# Patient Record
Sex: FEMALE | Race: WHITE | NOT HISPANIC OR LATINO | Employment: PART TIME | URBAN - METROPOLITAN AREA
[De-identification: names, ages, dates, MRNs, and addresses within clinical notes are randomized per-mention and may not be internally consistent; named-entity substitution may affect disease eponyms.]

---

## 2017-02-08 ENCOUNTER — LAB REQUISITION (OUTPATIENT)
Dept: LAB | Facility: HOSPITAL | Age: 27
End: 2017-02-08
Payer: COMMERCIAL

## 2017-02-08 ENCOUNTER — ALLSCRIPTS OFFICE VISIT (OUTPATIENT)
Dept: OTHER | Facility: OTHER | Age: 27
End: 2017-02-08

## 2017-02-08 DIAGNOSIS — Z01.419 ENCOUNTER FOR GYNECOLOGICAL EXAMINATION WITHOUT ABNORMAL FINDING: ICD-10-CM

## 2017-02-08 PROCEDURE — G0145 SCR C/V CYTO,THINLAYER,RESCR: HCPCS | Performed by: PHYSICIAN ASSISTANT

## 2017-02-13 ENCOUNTER — GENERIC CONVERSION - ENCOUNTER (OUTPATIENT)
Dept: OTHER | Facility: OTHER | Age: 27
End: 2017-02-13

## 2017-02-13 LAB
LAB AP GYN PRIMARY INTERPRETATION: NORMAL
LAB AP LMP: NORMAL
Lab: NORMAL

## 2017-05-09 ENCOUNTER — APPOINTMENT (OUTPATIENT)
Dept: PHYSICAL THERAPY | Facility: REHABILITATION | Age: 27
End: 2017-05-09
Payer: COMMERCIAL

## 2017-05-09 PROCEDURE — 97161 PT EVAL LOW COMPLEX 20 MIN: CPT

## 2017-05-09 PROCEDURE — 97110 THERAPEUTIC EXERCISES: CPT

## 2017-05-16 ENCOUNTER — APPOINTMENT (OUTPATIENT)
Dept: PHYSICAL THERAPY | Facility: REHABILITATION | Age: 27
End: 2017-05-16
Payer: COMMERCIAL

## 2017-05-16 PROCEDURE — 97110 THERAPEUTIC EXERCISES: CPT

## 2017-05-16 PROCEDURE — 97140 MANUAL THERAPY 1/> REGIONS: CPT

## 2017-05-16 PROCEDURE — 97112 NEUROMUSCULAR REEDUCATION: CPT

## 2017-05-19 ENCOUNTER — APPOINTMENT (OUTPATIENT)
Dept: PHYSICAL THERAPY | Facility: REHABILITATION | Age: 27
End: 2017-05-19
Payer: COMMERCIAL

## 2017-05-19 PROCEDURE — 97140 MANUAL THERAPY 1/> REGIONS: CPT

## 2017-05-19 PROCEDURE — 97110 THERAPEUTIC EXERCISES: CPT

## 2017-05-23 ENCOUNTER — APPOINTMENT (OUTPATIENT)
Dept: PHYSICAL THERAPY | Facility: REHABILITATION | Age: 27
End: 2017-05-23
Payer: COMMERCIAL

## 2017-05-23 PROCEDURE — 97110 THERAPEUTIC EXERCISES: CPT

## 2017-05-23 PROCEDURE — 97140 MANUAL THERAPY 1/> REGIONS: CPT

## 2017-05-31 ENCOUNTER — APPOINTMENT (OUTPATIENT)
Dept: PHYSICAL THERAPY | Facility: REHABILITATION | Age: 27
End: 2017-05-31
Payer: COMMERCIAL

## 2017-05-31 PROCEDURE — 97110 THERAPEUTIC EXERCISES: CPT

## 2017-05-31 PROCEDURE — 97140 MANUAL THERAPY 1/> REGIONS: CPT

## 2017-06-07 ENCOUNTER — APPOINTMENT (OUTPATIENT)
Dept: PHYSICAL THERAPY | Facility: REHABILITATION | Age: 27
End: 2017-06-07
Payer: COMMERCIAL

## 2017-06-07 PROCEDURE — 97162 PT EVAL MOD COMPLEX 30 MIN: CPT

## 2017-06-07 PROCEDURE — 97110 THERAPEUTIC EXERCISES: CPT

## 2017-06-07 PROCEDURE — 97140 MANUAL THERAPY 1/> REGIONS: CPT

## 2017-06-15 ENCOUNTER — APPOINTMENT (OUTPATIENT)
Dept: PHYSICAL THERAPY | Facility: REHABILITATION | Age: 27
End: 2017-06-15
Payer: COMMERCIAL

## 2017-06-15 PROCEDURE — 97140 MANUAL THERAPY 1/> REGIONS: CPT

## 2017-06-15 PROCEDURE — 97110 THERAPEUTIC EXERCISES: CPT

## 2017-06-22 ENCOUNTER — APPOINTMENT (OUTPATIENT)
Dept: PHYSICAL THERAPY | Facility: REHABILITATION | Age: 27
End: 2017-06-22
Payer: COMMERCIAL

## 2017-06-22 PROCEDURE — 97140 MANUAL THERAPY 1/> REGIONS: CPT

## 2017-06-22 PROCEDURE — 97110 THERAPEUTIC EXERCISES: CPT

## 2017-06-30 ENCOUNTER — APPOINTMENT (OUTPATIENT)
Dept: PHYSICAL THERAPY | Facility: REHABILITATION | Age: 27
End: 2017-06-30
Payer: COMMERCIAL

## 2017-07-07 ENCOUNTER — APPOINTMENT (OUTPATIENT)
Dept: PHYSICAL THERAPY | Facility: REHABILITATION | Age: 27
End: 2017-07-07
Payer: COMMERCIAL

## 2017-07-07 PROCEDURE — 97140 MANUAL THERAPY 1/> REGIONS: CPT

## 2017-07-07 PROCEDURE — 97110 THERAPEUTIC EXERCISES: CPT

## 2017-07-18 ENCOUNTER — APPOINTMENT (OUTPATIENT)
Dept: PHYSICAL THERAPY | Facility: REHABILITATION | Age: 27
End: 2017-07-18
Payer: COMMERCIAL

## 2017-07-18 PROCEDURE — 97110 THERAPEUTIC EXERCISES: CPT

## 2017-07-18 PROCEDURE — 97140 MANUAL THERAPY 1/> REGIONS: CPT

## 2017-07-18 PROCEDURE — 97014 ELECTRIC STIMULATION THERAPY: CPT

## 2017-07-26 ENCOUNTER — APPOINTMENT (OUTPATIENT)
Dept: PHYSICAL THERAPY | Facility: REHABILITATION | Age: 27
End: 2017-07-26
Payer: COMMERCIAL

## 2017-07-26 PROCEDURE — 97110 THERAPEUTIC EXERCISES: CPT

## 2017-07-26 PROCEDURE — 97140 MANUAL THERAPY 1/> REGIONS: CPT

## 2017-08-02 ENCOUNTER — APPOINTMENT (OUTPATIENT)
Dept: PHYSICAL THERAPY | Facility: REHABILITATION | Age: 27
End: 2017-08-02
Payer: COMMERCIAL

## 2017-08-02 PROCEDURE — 97110 THERAPEUTIC EXERCISES: CPT

## 2017-09-05 ENCOUNTER — APPOINTMENT (EMERGENCY)
Dept: RADIOLOGY | Facility: HOSPITAL | Age: 27
End: 2017-09-05
Payer: OTHER MISCELLANEOUS

## 2017-09-05 ENCOUNTER — HOSPITAL ENCOUNTER (EMERGENCY)
Facility: HOSPITAL | Age: 27
Discharge: HOME | End: 2017-09-05
Attending: EMERGENCY MEDICINE | Admitting: EMERGENCY MEDICINE
Payer: OTHER MISCELLANEOUS

## 2017-09-05 VITALS
SYSTOLIC BLOOD PRESSURE: 150 MMHG | OXYGEN SATURATION: 97 % | TEMPERATURE: 98.3 F | HEART RATE: 120 BPM | WEIGHT: 120 LBS | DIASTOLIC BLOOD PRESSURE: 74 MMHG | RESPIRATION RATE: 18 BRPM

## 2017-09-05 DIAGNOSIS — S60.10XA: ICD-10-CM

## 2017-09-05 DIAGNOSIS — S62.639A CLOSED FRACTURE OF TUFT OF DISTAL PHALANX OF FINGER: Primary | ICD-10-CM

## 2017-09-05 PROCEDURE — 99283 EMERGENCY DEPT VISIT LOW MDM: CPT

## 2017-09-05 PROCEDURE — 90715 TDAP VACCINE 7 YRS/> IM: CPT | Performed by: EMERGENCY MEDICINE

## 2017-09-05 PROCEDURE — 73140 X-RAY EXAM OF FINGER(S): CPT

## 2017-09-05 PROCEDURE — 90471 IMMUNIZATION ADMIN: CPT

## 2017-09-05 RX ORDER — LIDOCAINE HYDROCHLORIDE 10 MG/ML
10 INJECTION, SOLUTION INFILTRATION; PERINEURAL ONCE
Status: DISCONTINUED | OUTPATIENT
Start: 2017-09-05 | End: 2017-09-05

## 2017-09-05 RX ORDER — OXYCODONE HYDROCHLORIDE AND ACETAMINOPHEN 5; 325 MG/1; MG/1
1 TABLET ORAL EVERY 4 HOURS PRN
Qty: 10 TABLET | Refills: 0 | Status: SHIPPED | OUTPATIENT
Start: 2017-09-05 | End: 2017-09-15

## 2017-09-05 RX ORDER — OXYCODONE HYDROCHLORIDE AND ACETAMINOPHEN 5; 325 MG/1; MG/1
1 TABLET ORAL ONCE
Status: COMPLETED | OUTPATIENT
Start: 2017-09-05 | End: 2017-09-05

## 2017-09-05 RX ORDER — DROSPIRENONE AND ETHINYL ESTRADIOL 0.02-3(28)
1 KIT ORAL DAILY
COMMUNITY
End: 2018-02-09

## 2017-09-05 RX ORDER — LIDOCAINE HYDROCHLORIDE 10 MG/ML
10 INJECTION, SOLUTION EPIDURAL; INFILTRATION; INTRACAUDAL; PERINEURAL ONCE
Status: COMPLETED | OUTPATIENT
Start: 2017-09-05 | End: 2017-09-05

## 2017-09-05 RX ADMIN — TETANUS TOXOID, REDUCED DIPHTHERIA TOXOID AND ACELLULAR PERTUSSIS VACCINE, ADSORBED 0.5 ML: 5; 2.5; 8; 8; 2.5 SUSPENSION INTRAMUSCULAR at 15:03

## 2017-09-05 RX ADMIN — LIDOCAINE HYDROCHLORIDE 10 ML: 10 INJECTION, SOLUTION EPIDURAL; INFILTRATION; INTRACAUDAL; PERINEURAL at 14:59

## 2017-09-05 RX ADMIN — OXYCODONE AND ACETAMINOPHEN 1 TABLET: 5; 325 TABLET ORAL at 13:53

## 2017-10-10 ENCOUNTER — APPOINTMENT (OUTPATIENT)
Dept: LAB | Age: 27
End: 2017-10-10
Payer: COMMERCIAL

## 2017-10-10 ENCOUNTER — TRANSCRIBE ORDERS (OUTPATIENT)
Dept: ADMINISTRATIVE | Age: 27
End: 2017-10-10

## 2017-10-10 DIAGNOSIS — Z01.818 PRE-OPERATIVE CLEARANCE: ICD-10-CM

## 2017-10-10 DIAGNOSIS — Z01.818 PRE-OPERATIVE CLEARANCE: Primary | ICD-10-CM

## 2017-10-10 LAB
ERYTHROCYTE [DISTWIDTH] IN BLOOD BY AUTOMATED COUNT: 12 % (ref 11.6–15.1)
HCT VFR BLD AUTO: 42.4 % (ref 34.8–46.1)
HGB BLD-MCNC: 14.6 G/DL (ref 11.5–15.4)
MCH RBC QN AUTO: 30.7 PG (ref 26.8–34.3)
MCHC RBC AUTO-ENTMCNC: 34.4 G/DL (ref 31.4–37.4)
MCV RBC AUTO: 89 FL (ref 82–98)
PLATELET # BLD AUTO: 346 THOUSANDS/UL (ref 149–390)
PMV BLD AUTO: 9.7 FL (ref 8.9–12.7)
RBC # BLD AUTO: 4.76 MILLION/UL (ref 3.81–5.12)
WBC # BLD AUTO: 6.26 THOUSAND/UL (ref 4.31–10.16)

## 2017-10-10 PROCEDURE — 36415 COLL VENOUS BLD VENIPUNCTURE: CPT

## 2017-10-10 PROCEDURE — 85027 COMPLETE CBC AUTOMATED: CPT

## 2018-01-11 NOTE — MISCELLANEOUS
Message   Recorded as Task   Date: 03/16/2017 12:39 PM, Created By: Brigette Doshi   Task Name: Call Back   Assigned To: Elsie Darling   Regarding Patient: Allan Rueda, Status: In Progress   Comment:    Alethea Lala - 16 Mar 2017 12:39 PM     TASK CREATED  PT CALLED C/O BREAKTHROUGH BLEEDING X 15 DAYS ON NEW BCP  SHE WILL BE IN A MEETING FROM 1:15PM TO 2:15PM  HER # [phone]   Iman Landeros - 16 Mar 2017 2:45 PM     TASK IN PROGRESS   Iman Landeros - 16 Mar 2017 2:46 PM     TASK EDITED  Jasmyne Mackenzie - 39 Mar 2017 4:16 PM     TASK EDITED  Pt returned your call and LM  Iman Landeros - [phone] [id number] 4:17 PM     TASK IN PROGRESS   Iman Landeros - 16 Mar 2017 4:22 PM     TASK EDITED  Pt on generics of eloy  She takes all actives for 3 mos  She had menses just before she saw you  She was on a generic called Ismael Zeng last mo and this mo (new pharm) put on [facility]  She has been bleeding entire 3 weeks of new pill  Nothing missed or late  What to do??   Emilee Herndon - 17 Mar 2017 7:04 AM     TASK REPLIED TO: Previously Assigned To Emilee Herndon  whatever generic she likes we can call in that generic with "brand necessary "   Liz Rosa - 17 Mar 2017 8:22 AM     TASK EDITED  spoke with pt    she would like to have rx sent to [facility], bath for [facility]  rx to [employer] [facility] - 17 Mar 2017 8:25 AM     TASK EDITED  rx sent as thi        Active Problems    1  Contraceptives (V25 02)   2  Encounter for gynecological examination without abnormal finding (V72 31) (Z01 419)   3  Sore throat (462) (J02 9)   4  Surveillance of previously prescribed contraceptive method (V25 40) (Z30 40)    Current Meds   1  Acidophilus CAPS; Therapy: (Recorded:17Jan2014) to Recorded   2  BD Allergy Syringe 28G X 1/2" 1 ML Miscellaneous; Therapy: 13SFM1112 to (Last Rx:28Nov2011)  Requested for: 28Nov2011 Ordered   3  [facility] Vitamin D CAPS; Therapy: (Recorded:17Jan2014) to Recorded   4  ELOY 3-0 02 MG Oral Tablet;  Take one active pill daily do not take inactive pills cont cycle   with the pill; Therapy: 32YVO4225 to (Last Rx:46Vyf2979)  Requested for: 58IED9547 Ordered    Allergies    1   Sulfa Drugs    Plan  Contraceptives, Surveillance of previously prescribed contraceptive method    · From  ELOY 3-0 02 MG Oral Tablet Take one active pill daily do not take inactive  pills cont cycle with the pill To Nara 3-0 02 MG Oral Tablet TAKE 1 TABLET DAILY    Signatures   Electronically signed by : Ronda Norwood, ; Mar 17 2017  8:25AM EST                       (Author)

## 2018-01-14 VITALS
WEIGHT: 121 LBS | HEIGHT: 62 IN | BODY MASS INDEX: 22.26 KG/M2 | SYSTOLIC BLOOD PRESSURE: 110 MMHG | DIASTOLIC BLOOD PRESSURE: 64 MMHG

## 2018-02-09 ENCOUNTER — OFFICE VISIT (OUTPATIENT)
Dept: OBGYN CLINIC | Facility: CLINIC | Age: 28
End: 2018-02-09
Payer: COMMERCIAL

## 2018-02-09 VITALS
BODY MASS INDEX: 22.82 KG/M2 | HEIGHT: 62 IN | WEIGHT: 124 LBS | DIASTOLIC BLOOD PRESSURE: 64 MMHG | SYSTOLIC BLOOD PRESSURE: 102 MMHG

## 2018-02-09 DIAGNOSIS — Z01.419 ENCNTR FOR GYN EXAM (GENERAL) (ROUTINE) W/O ABN FINDINGS: Primary | ICD-10-CM

## 2018-02-09 PROCEDURE — 99395 PREV VISIT EST AGE 18-39: CPT | Performed by: PHYSICIAN ASSISTANT

## 2018-02-09 RX ORDER — DROSPIRENONE AND ETHINYL ESTRADIOL 0.02-3(28)
1 KIT ORAL DAILY
Qty: 90 TABLET | Refills: 3 | Status: SHIPPED | OUTPATIENT
Start: 2018-02-09 | End: 2018-10-31 | Stop reason: SDUPTHER

## 2018-02-09 RX ORDER — DROSPIRENONE AND ETHINYL ESTRADIOL 0.02-3(28)
KIT ORAL
COMMUNITY
Start: 2012-02-08 | End: 2018-02-09 | Stop reason: SDUPTHER

## 2018-02-09 NOTE — PROGRESS NOTES
Kajal Candelario  1990    CC:  Yearly exam    S:  32 y o  female here for yearly exam  Her cycles are regular, not heavy or crampy  She is sexually active  She uses [place] (pete) for contraception  She does not request STD testing today  She is  and happy  She works in the [employer] and as a flight nurse for [employer]  She had a rough year this past year when her coworker in his [employer]P  in a helicopter crash  She decided to go forward with a breast augmentation that she had always wanted to do and she is very happy with the results  Last Pap 17 neg      Current Outpatient Prescriptions:     drospirenone-ethinyl estradiol (Beverely Ezel) 3-0 02 MG per tablet, Take 1 tablet by mouth daily, Disp: 90 tablet, Rfl: 3  Social History     Social History    Marital status: Single     Spouse name: N/A    Number of children: N/A    Years of education: N/A     Occupational History    Not on file       Social History Main Topics    Smoking status: Never Smoker    Smokeless tobacco: Never Used    Alcohol use Yes      Comment: social     Drug use: No    Sexual activity: Yes     Partners: Male     Birth control/ protection: Pill     Other Topics Concern    Not on file     Social History Narrative    No caffeine use    Exercises regularly         Family History   Problem Relation Age of Onset    Arthritis Mother    Cinthya Pall Breast cancer Mother     Depression Mother     Anxiety disorder Mother     Insomnia Mother     Osteoporosis Mother     Arthritis Father     Hypertension Father     Hyperlipidemia Father     Depression Maternal Grandmother     Anxiety disorder Maternal Grandmother     Osteoporosis Maternal Grandmother     Heart disease Maternal Grandfather     Hypertension Paternal Grandmother     Heart disease Paternal Grandfather     Insomnia Sister      Past Medical History:   Diagnosis Date    Anxiety     H/O breast augmentation          O:  Blood pressure 102/64, height 5' 1 81" (1 57 m), weight 56 2 kg (124 lb)  Patient appears well and is not in distress  Neck is supple without masses  Breasts are symmetrical without mass, tenderness, nipple discharge, skin changes or adenopathy  Abdomen is soft and nontender without masses  External genitals are normal without lesions or rashes  Vagina is normal without discharge or bleeding  Cervix is normal without discharge or lesion  Uterus is normal, mobile, nontender without palpable mass  Adnexa are normal, nontender, without palpable mass  A:  Yearly exam      P:  Rx Nara sent to mail order  RTO one year for yearly exam or sooner as needed

## 2018-02-22 ENCOUNTER — TELEPHONE (OUTPATIENT)
Dept: OBGYN CLINIC | Facility: CLINIC | Age: 28
End: 2018-02-22

## 2018-10-31 DIAGNOSIS — Z01.419 ENCNTR FOR GYN EXAM (GENERAL) (ROUTINE) W/O ABN FINDINGS: ICD-10-CM

## 2018-10-31 RX ORDER — ETHINYL ESTRADIOL/DROSPIRENONE 0.02-3(28)
1 TABLET ORAL DAILY
Qty: 90 TABLET | Refills: 1 | Status: SHIPPED | OUTPATIENT
Start: 2018-10-31 | End: 2018-11-19 | Stop reason: SDUPTHER

## 2018-11-19 ENCOUNTER — TELEPHONE (OUTPATIENT)
Dept: OBGYN CLINIC | Facility: CLINIC | Age: 28
End: 2018-11-19

## 2018-11-19 DIAGNOSIS — Z01.419 ENCNTR FOR GYN EXAM (GENERAL) (ROUTINE) W/O ABN FINDINGS: ICD-10-CM

## 2018-11-19 NOTE — PROGRESS NOTES
Call from patient  [address] [facility] does not carry [facility] any longer  Asked to send to CV, [address]  Resent as requested

## 2018-11-26 RX ORDER — ETHINYL ESTRADIOL/DROSPIRENONE 0.02-3(28)
1 TABLET ORAL DAILY
Qty: 90 TABLET | Refills: 1 | Status: SHIPPED | OUTPATIENT
Start: 2018-11-26 | End: 2019-02-19 | Stop reason: SDUPTHER

## 2018-12-07 ENCOUNTER — TRANSCRIBE ORDERS (OUTPATIENT)
Dept: ADMINISTRATIVE | Facility: HOSPITAL | Age: 28
End: 2018-12-07

## 2018-12-07 ENCOUNTER — APPOINTMENT (OUTPATIENT)
Dept: LAB | Facility: HOSPITAL | Age: 28
End: 2018-12-07
Attending: PLASTIC SURGERY
Payer: COMMERCIAL

## 2018-12-07 DIAGNOSIS — Z01.812 PRE-OPERATIVE LABORATORY EXAMINATION: Primary | ICD-10-CM

## 2018-12-07 DIAGNOSIS — Z01.812 PRE-OPERATIVE LABORATORY EXAMINATION: ICD-10-CM

## 2018-12-07 LAB
APTT PPP: 28 SECONDS (ref 23–34)
ERYTHROCYTE [DISTWIDTH] IN BLOOD BY AUTOMATED COUNT: 12.6 %
HCT VFR BLD AUTO: 43.6 % (ref 36–46)
HGB BLD-MCNC: 14.4 G/DL (ref 12–16)
INR PPP: 1 (ref 0.89–1.1)
MCH RBC QN AUTO: 30.3 PG (ref 26–34)
MCHC RBC AUTO-ENTMCNC: 33.1 G/DL (ref 31–36)
MCV RBC AUTO: 92 FL (ref 80–100)
PLATELET # BLD AUTO: 346 THOUSANDS/UL (ref 150–450)
PMV BLD AUTO: 7.9 FL (ref 8.9–12.7)
PROTHROMBIN TIME: 10.6 SECONDS (ref 9.5–11.6)
RBC # BLD AUTO: 4.76 MILLION/UL (ref 4–5.2)
WBC # BLD AUTO: 6.4 THOUSAND/UL (ref 4.5–11)

## 2018-12-07 PROCEDURE — 85730 THROMBOPLASTIN TIME PARTIAL: CPT

## 2018-12-07 PROCEDURE — 85610 PROTHROMBIN TIME: CPT

## 2018-12-07 PROCEDURE — 85027 COMPLETE CBC AUTOMATED: CPT

## 2018-12-07 PROCEDURE — 36415 COLL VENOUS BLD VENIPUNCTURE: CPT

## 2018-12-20 ENCOUNTER — ANESTHESIA EVENT (OUTPATIENT)
Dept: PERIOP | Facility: HOSPITAL | Age: 28
End: 2018-12-20
Payer: SELF-PAY

## 2018-12-20 NOTE — ANESTHESIA PREPROCEDURE EVALUATION
Review of Systems/Medical History  Patient summary reviewed  Chart reviewed  No history of anesthetic complications     Cardiovascular  Negative cardio ROS Exercise tolerance (METS): >4,     Pulmonary  Negative pulmonary ROS Not a smoker , No asthma , No sleep apnea ,        GI/Hepatic  Negative GI/hepatic ROS          Negative  ROS        Endo/Other  Negative endo/other ROS      GYN  Not currently pregnant , Prior pregnancy/OB history : 0 Parity: 0,          Hematology  Negative hematology ROS      Musculoskeletal       Neurology    Headaches,    Psychology   Anxiety,              Physical Exam    Airway    Mallampati score: I  TM Distance: >3 FB       Dental       Cardiovascular  Comment: Negative ROS, Cardiovascular exam normal    Pulmonary  Pulmonary exam normal     Other Findings  Fixed upper and lower teeth and in good repair      Anesthesia Plan  ASA Score- 1     Anesthesia Type- IV sedation with anesthesia with ASA Monitors  Additional Monitors:   Airway Plan: ETT  Plan Factors-Patient not instructed to abstain from smoking on day of procedure       Induction- intravenous  Postoperative Plan- Plan for postoperative opioid use  Informed Consent- Anesthetic plan and risks discussed with patient and spouse  I personally reviewed this patient with the CRNA  Discussed and agreed on the Anesthesia Plan with the CRNA  Santos Ferraro

## 2018-12-20 NOTE — PRE-PROCEDURE INSTRUCTIONS
Pre-op Showering Instructions for Surgery Patients    Before your operation, you play an important role in decreasing your risk for infection by washing with special antiseptic soap  This is an effective way to reduce bacteria on the skin which may help to prevent infections at the surgical site  Please read the following directions in advance  1  In the week before your operation, purchase a 4 ounce bottle of antiseptic soap containing chlorhexidine gluconate (CHG)  4%  Some brand names include: Aplicare®, Endure, and Hibiclens®  The cost is usually less than $5 00   For your convenience, the [employer] carries the soap   It may also be available at your doctors office or pre-admission testing center, and at most retail pharmacies   If you are allergic or sensitive to soaps containing CHG, please let your doctor know so another antiseptic can be suggested   CHG antiseptic soap is for external use only  2  The day before your operation, follow these instructions carefully to get ready   Please clean linens (sheets) on your bed; you should sleep on clean sheets after your evening shower   Get clean towels and washcloth ready - you need enough for 2 showers   Set aside clean underwear, pajamas, and clothing to wear after the showers     Reminders:   DO NOT use any other soap or body rinse on your skin during or after the antiseptic showers   DO NOT use lotion, powder, deodorant, or perfume/aftershave of any kind on your skin after your antiseptic shower   DO NOT shave any body parts in the 24 hours/day before your operation   DO NOT get the antiseptic soap in your eyes, ears, nose, mouth, or vaginal area    3  You will need to shower the night before AND the morning of your surgery  Shower 1:   The first evening before the operation, take the first shower   First, shampoo your hair with regular shampoo and rinse it completely before you use the antiseptic soap   After washing and rinsing your hair, rinse your body   Next, use a clean washcloth to apply the antiseptic soap and wash your body from the neck down to your toes using ½ bottle of the antiseptic soap  You will use the other ½ bottle for the second shower   Clean the area where your incision will be; lather this area well for about 2 minutes   If you are having head or neck surgery, wash areas with the antiseptic soap   Rinse yourself completely with running water   Use a clean towel to dry off   Wear clean underwear and clothing/pajamas  Shower 2   The morning of your operation, take the second shower following the same steps as Shower 1 using the second ½ of the bottle of antiseptic soap   Use clean cloths and towels to wash and dry yourself   Wear clean underwear and clothing    No outpatient prescriptions have been marked as taking for the 12/21/18 encounter [facility] Encounter)

## 2018-12-21 ENCOUNTER — ANESTHESIA (OUTPATIENT)
Dept: PERIOP | Facility: HOSPITAL | Age: 28
End: 2018-12-21
Payer: SELF-PAY

## 2018-12-21 ENCOUNTER — HOSPITAL ENCOUNTER (OUTPATIENT)
Facility: HOSPITAL | Age: 28
Setting detail: OUTPATIENT SURGERY
Discharge: HOME | End: 2018-12-21
Attending: PLASTIC SURGERY | Admitting: PLASTIC SURGERY
Payer: SELF-PAY

## 2018-12-21 VITALS
DIASTOLIC BLOOD PRESSURE: 74 MMHG | OXYGEN SATURATION: 99 % | RESPIRATION RATE: 14 BRPM | TEMPERATURE: 97.5 F | HEART RATE: 68 BPM | SYSTOLIC BLOOD PRESSURE: 115 MMHG

## 2018-12-21 DIAGNOSIS — T85.44XA CAPSULAR CONTRACTURE OF BREAST IMPLANT: Primary | ICD-10-CM

## 2018-12-21 LAB — EXT PREGNANCY TEST URINE: NEGATIVE

## 2018-12-21 PROCEDURE — 81025 URINE PREGNANCY TEST: CPT | Performed by: ANESTHESIOLOGY

## 2018-12-21 RX ORDER — DEXAMETHASONE SODIUM PHOSPHATE 4 MG/ML
INJECTION, SOLUTION INTRA-ARTICULAR; INTRALESIONAL; INTRAMUSCULAR; INTRAVENOUS; SOFT TISSUE AS NEEDED
Status: DISCONTINUED | OUTPATIENT
Start: 2018-12-21 | End: 2018-12-21 | Stop reason: SURG

## 2018-12-21 RX ORDER — FENTANYL CITRATE/PF 50 MCG/ML
12.5 SYRINGE (ML) INJECTION
Status: DISCONTINUED | OUTPATIENT
Start: 2018-12-21 | End: 2018-12-21 | Stop reason: HOSPADM

## 2018-12-21 RX ORDER — PROPOFOL 10 MG/ML
INJECTION, EMULSION INTRAVENOUS CONTINUOUS PRN
Status: DISCONTINUED | OUTPATIENT
Start: 2018-12-21 | End: 2018-12-21 | Stop reason: SURG

## 2018-12-21 RX ORDER — LIDOCAINE HYDROCHLORIDE AND EPINEPHRINE 5; 5 MG/ML; UG/ML
INJECTION, SOLUTION INFILTRATION; PERINEURAL AS NEEDED
Status: DISCONTINUED | OUTPATIENT
Start: 2018-12-21 | End: 2018-12-21 | Stop reason: HOSPADM

## 2018-12-21 RX ORDER — DEXAMETHASONE SODIUM PHOSPHATE 4 MG/ML
4 INJECTION, SOLUTION INTRA-ARTICULAR; INTRALESIONAL; INTRAMUSCULAR; INTRAVENOUS; SOFT TISSUE ONCE AS NEEDED
Status: DISCONTINUED | OUTPATIENT
Start: 2018-12-21 | End: 2018-12-21 | Stop reason: HOSPADM

## 2018-12-21 RX ORDER — FENTANYL CITRATE 50 UG/ML
INJECTION, SOLUTION INTRAMUSCULAR; INTRAVENOUS AS NEEDED
Status: DISCONTINUED | OUTPATIENT
Start: 2018-12-21 | End: 2018-12-21 | Stop reason: SURG

## 2018-12-21 RX ORDER — MIDAZOLAM HYDROCHLORIDE 1 MG/ML
INJECTION INTRAMUSCULAR; INTRAVENOUS AS NEEDED
Status: DISCONTINUED | OUTPATIENT
Start: 2018-12-21 | End: 2018-12-21 | Stop reason: SURG

## 2018-12-21 RX ORDER — OXYCODONE HYDROCHLORIDE AND ACETAMINOPHEN 5; 325 MG/1; MG/1
1 TABLET ORAL EVERY 4 HOURS PRN
Status: DISCONTINUED | OUTPATIENT
Start: 2018-12-21 | End: 2018-12-21 | Stop reason: HOSPADM

## 2018-12-21 RX ORDER — PROPOFOL 10 MG/ML
INJECTION, EMULSION INTRAVENOUS AS NEEDED
Status: DISCONTINUED | OUTPATIENT
Start: 2018-12-21 | End: 2018-12-21 | Stop reason: SURG

## 2018-12-21 RX ORDER — DIPHENHYDRAMINE HYDROCHLORIDE 50 MG/ML
12.5 INJECTION INTRAMUSCULAR; INTRAVENOUS ONCE
Status: DISCONTINUED | OUTPATIENT
Start: 2018-12-21 | End: 2018-12-21 | Stop reason: HOSPADM

## 2018-12-21 RX ORDER — MAGNESIUM HYDROXIDE 1200 MG/15ML
LIQUID ORAL AS NEEDED
Status: DISCONTINUED | OUTPATIENT
Start: 2018-12-21 | End: 2018-12-21 | Stop reason: HOSPADM

## 2018-12-21 RX ORDER — OXYCODONE HYDROCHLORIDE AND ACETAMINOPHEN 5; 325 MG/1; MG/1
1 TABLET ORAL EVERY 4 HOURS PRN
Qty: 30 TABLET | Refills: 0 | Status: SHIPPED | OUTPATIENT
Start: 2018-12-21 | End: 2018-12-31

## 2018-12-21 RX ORDER — CEPHALEXIN 500 MG/1
500 CAPSULE ORAL EVERY 8 HOURS SCHEDULED
Qty: 21 CAPSULE | Refills: 0 | Status: SHIPPED | OUTPATIENT
Start: 2018-12-21 | End: 2018-12-28

## 2018-12-21 RX ORDER — SODIUM CHLORIDE, SODIUM LACTATE, POTASSIUM CHLORIDE, CALCIUM CHLORIDE 600; 310; 30; 20 MG/100ML; MG/100ML; MG/100ML; MG/100ML
75 INJECTION, SOLUTION INTRAVENOUS CONTINUOUS
Status: DISCONTINUED | OUTPATIENT
Start: 2018-12-21 | End: 2018-12-21 | Stop reason: HOSPADM

## 2018-12-21 RX ORDER — LIDOCAINE HYDROCHLORIDE 10 MG/ML
INJECTION, SOLUTION INFILTRATION; PERINEURAL AS NEEDED
Status: DISCONTINUED | OUTPATIENT
Start: 2018-12-21 | End: 2018-12-21 | Stop reason: SURG

## 2018-12-21 RX ORDER — BUPIVACAINE HYDROCHLORIDE AND EPINEPHRINE 5; 5 MG/ML; UG/ML
INJECTION, SOLUTION EPIDURAL; INTRACAUDAL; PERINEURAL AS NEEDED
Status: DISCONTINUED | OUTPATIENT
Start: 2018-12-21 | End: 2018-12-21 | Stop reason: HOSPADM

## 2018-12-21 RX ORDER — FENTANYL CITRATE/PF 50 MCG/ML
25 SYRINGE (ML) INJECTION
Status: DISCONTINUED | OUTPATIENT
Start: 2018-12-21 | End: 2018-12-21 | Stop reason: HOSPADM

## 2018-12-21 RX ORDER — CEFAZOLIN SODIUM 1 G/50ML
1000 SOLUTION INTRAVENOUS ONCE
Status: COMPLETED | OUTPATIENT
Start: 2018-12-21 | End: 2018-12-21

## 2018-12-21 RX ORDER — ONDANSETRON 2 MG/ML
INJECTION INTRAMUSCULAR; INTRAVENOUS AS NEEDED
Status: DISCONTINUED | OUTPATIENT
Start: 2018-12-21 | End: 2018-12-21 | Stop reason: SURG

## 2018-12-21 RX ADMIN — PROPOFOL 100 MCG/KG/MIN: 10 INJECTION, EMULSION INTRAVENOUS at 09:53

## 2018-12-21 RX ADMIN — ONDANSETRON HYDROCHLORIDE 4 MG: 2 INJECTION, SOLUTION INTRAMUSCULAR; INTRAVENOUS at 11:32

## 2018-12-21 RX ADMIN — MIDAZOLAM HYDROCHLORIDE 2 MG: 1 INJECTION, SOLUTION INTRAMUSCULAR; INTRAVENOUS at 09:53

## 2018-12-21 RX ADMIN — OXYCODONE HYDROCHLORIDE AND ACETAMINOPHEN 1 TABLET: 5; 325 TABLET ORAL at 14:10

## 2018-12-21 RX ADMIN — FENTANYL CITRATE 50 MCG: 50 INJECTION INTRAMUSCULAR; INTRAVENOUS at 11:06

## 2018-12-21 RX ADMIN — FENTANYL CITRATE 50 MCG: 50 INJECTION INTRAMUSCULAR; INTRAVENOUS at 09:53

## 2018-12-21 RX ADMIN — LIDOCAINE HYDROCHLORIDE 40 MG: 10 INJECTION, SOLUTION INFILTRATION; PERINEURAL at 09:53

## 2018-12-21 RX ADMIN — FENTANYL CITRATE 50 MCG: 50 INJECTION INTRAMUSCULAR; INTRAVENOUS at 10:25

## 2018-12-21 RX ADMIN — SODIUM CHLORIDE, POTASSIUM CHLORIDE, SODIUM LACTATE AND CALCIUM CHLORIDE: 600; 310; 30; 20 INJECTION, SOLUTION INTRAVENOUS at 09:28

## 2018-12-21 RX ADMIN — PROPOFOL 30 MG: 10 INJECTION, EMULSION INTRAVENOUS at 09:56

## 2018-12-21 RX ADMIN — SODIUM CHLORIDE, POTASSIUM CHLORIDE, SODIUM LACTATE AND CALCIUM CHLORIDE 75 ML/HR: 600; 310; 30; 20 INJECTION, SOLUTION INTRAVENOUS at 09:12

## 2018-12-21 RX ADMIN — DEXAMETHASONE SODIUM PHOSPHATE 4 MG: 4 INJECTION, SOLUTION INTRA-ARTICULAR; INTRALESIONAL; INTRAMUSCULAR; INTRAVENOUS; SOFT TISSUE at 10:25

## 2018-12-21 RX ADMIN — CEFAZOLIN SODIUM 1000 MG: 1 SOLUTION INTRAVENOUS at 09:54

## 2018-12-21 NOTE — NURSING NOTE
Pt returned to [facility] awake alert, IV infusing, family at bedside  Surgical bra on, no drainage noted, no complaints at present, taking PO

## 2018-12-21 NOTE — ANESTHESIA POSTPROCEDURE EVALUATION
Post-Op Assessment Note      CV Status:  Stable    Mental Status:  Alert and awake    Hydration Status:  Euvolemic    PONV Controlled:  Controlled    Airway Patency:  Patent    Post Op Vitals Reviewed: Yes          Staff: Anesthesiologist, CRNA           BP 98/59 (12/21/18 1150)    Temp 98 °F (36 7 °C) (12/21/18 1145)    Pulse 63 (12/21/18 1150)   Resp 14 (12/21/18 1150)    SpO2 98 % (12/21/18 1150)

## 2018-12-21 NOTE — ANESTHESIA POSTPROCEDURE EVALUATION
Post-Op Assessment Note      CV Status:  Stable    Mental Status:  Alert    Hydration Status:  Stable    PONV Controlled:  Controlled    Airway Patency:  Patent        Staff: CRNA           BP   99/57   Temp   98   Pulse  71   Resp 20   SpO2 98

## 2018-12-21 NOTE — INTERVAL H&P NOTE
H&P reviewed  After examining the patient I find no changes in the patients condition since the H&P had been written   no change

## 2018-12-21 NOTE — H&P
H&P Exam - Esteban Etienne 25 y o  female MRN: [id number]    Unit/Bed#:  Encounter: [id number]    Assessment:    Malposition of left breast implant    Plan:  re position left breast implant    History of Present Illness   This is a 53-year-old female who underwent bilateral breast augmentation mammoplasty approximately 1 year ago  Patient's left implant never dropped into the pocket and will be repositioned patient understands there is a 50% higher increased incidence of capsular contracture after developing the 1st capsule  Review of Systems   Eyes: Negative  Historical Information   Past Medical History:   Diagnosis Date    Anxiety     H/O breast augmentation     H/O seasonal allergies     Migraines      Past Surgical History:   Procedure Laterality Date    AUGMENTATION BREAST      MOUTH SURGERY      TONSILLECTOMY       Social History   History   Alcohol Use    Yes     Comment: social      History   Drug Use No     History   Smoking Status    Never Smoker   Smokeless Tobacco    Never Used     Family History: non-contributory    Meds/Allergies   all medications and allergies reviewed  Allergies   Allergen Reactions    Sulfa Antibiotics Hives       Objective   First Vitals:        Current Vitals:        No intake or output data in the 24 hours ending 12/20/18 2023    Invasive Devices          No matching active lines, drains, or airways          Physical Exam examination of the head ears eyes nose and throat is within normal limits heart sounds S1-S2 heard no murmurs or gallops  Lungs clear  Abdomen is soft  Extremities are within normal limits  Examination of the breasts the left breast is is soft nontender however the implant is displaced upwardly and the inframammary fold will be lowered to the opposite side implant exchange will take place      Lab Results:   Imaging:   EKG, Pathology, and Other Studies:     Code Status: No Order  Advance Directive and Living Will:      Power of :    POLST:      Counseling / Coordination of Care:   None

## 2018-12-21 NOTE — NURSING NOTE
Pt seen and instructed by Dr Collado Person, written and verbal instructions given  Pt and family verbalize an understanding of all instructions and use of Rx  Rx faxed to [address] [place] [address] Surgical bra remains clean and dry

## 2018-12-21 NOTE — DISCHARGE INSTRUCTIONS
After Breast Reconstruction with Implants or Expanders   AMBULATORY CARE:   Call 911 for any of the following:   · You feel lightheaded, short of breath, and have chest pain  · You cough up blood  · You have trouble breathing  Seek care immediately if:   · Blood soaks through your bandage  · Your stitches come apart  · Your bruise suddenly gets bigger  · Your breast suddenly changes shape  · Your leg or arm is larger than normal and painful  Contact your healthcare provider if:   · You have a fever or chills  · Your wound is red, swollen, or draining pus  · You have nausea or are vomiting  · Your skin is itchy, swollen, or you have a rash  · Your pain does not get better after you take pain medicine  · Your drain falls out or stops draining fluid  · Your drain has pus or foul-smelling fluid coming out of it  · You have questions or concerns about your condition or care  Medicines: You may need any of the following:  · Antibiotics  help prevent a bacterial infection  · Prescription pain medicine  may be given  Ask your healthcare provider how to take this medicine safely  Some prescription pain medicines contain acetaminophen  Do not take other medicines that contain acetaminophen without talking to your healthcare provider  Too much acetaminophen may cause liver damage  Prescription pain medicine may cause constipation  Ask your healthcare provider how to prevent or treat constipation  · NSAIDs , such as ibuprofen, help decrease swelling, pain, and fever  NSAIDs can cause stomach bleeding or kidney problems in certain people  If you take blood thinner medicine, always ask your healthcare provider if NSAIDs are safe for you  Always read the medicine label and follow directions  · Take your medicine as directed  Contact your healthcare provider if you think your medicine is not helping or if you have side effects   Tell him or her if you are allergic to any medicine  Keep a list of the medicines, vitamins, and herbs you take  Include the amounts, and when and why you take them  Bring the list or the pill bottles to follow-up visits  Carry your medicine list with you in case of an emergency  Bathing:  Ask your healthcare provider when your incisions can get wet  You may need to take a sponge bath until your drains are removed  When you can shower, carefully wash around the incisions with soap and water  It is okay to allow the soap and water to gently run over your incision  Gently pat the area dry and put on new, clean bandages as directed  Wound care:  Change your bandages when they get wet or dirty  It may feel more comfortable to place gauze over your incisions before you put on a bra  Check your incision every day for redness, pus, or swelling  Do not put powders or lotions on your incisions  Drain care:  Empty your drains as directed  You may need to write down how much fluid you empty from your drains each day  Ask your healthcare provider how to pin your drains to your bra or pants to prevent pulling  Your drains may be removed in 1 to 3 weeks  Ask your healthcare provider for more information about how to care for your drain  Wear a supportive bra as directed: You may be given a surgical bra or told to wear a sports bra  A supportive bra may help hold your bandages in place  It may also help with swelling and pain  Do not  wear bras with lace or underwire  They may rub against your incision and cause discomfort  Activity:  Do not lift anything heavier than 2 pounds  Do not push or pull with your arms  Do not play sports or do vigorous activities  Start with short walks around the house  This may prevent blood clots and help with healing  Gradually walk further each day  Ask your healthcare provider when you can return to your normal activities  Arm stretches: Your healthcare provider may show you how to do arm stretches   Arm stretches may prevent stiff arms or shoulders  You may need to wait until after your drains are removed to begin stretching  Ask your healthcare provider for more information about arm stretches  Return to work: Your return to work may depend on the type of job you have  Ask your healthcare provider when it is okay for you to return to work  Filling expanders after surgery: If you have tissue expanders, you will need to return several times to have them filled  This will happen over 2 to 3 months  Your healthcare provider will use local anesthesia to numb the injection area  With local anesthesia, you may feel pressure or pushing, but you should not feel any pain  Next, he will insert a needle into your skin  When he reaches the expander sac he will inject saline (salt water)  This procedure will gradually stretch your skin and increase the size of your breast    Nipple and areola reconstruction:  You can choose to have nipple and areola reconstruction  This may be done after your breast heals from the first stage of reconstruction  Talk to your healthcare provider about your options  Follow up MRI:  If you have silicone implants you will need to return for an MRI  MRI pictures will make sure that the implant is not broken or leaking fluid  Do not enter the MRI room with anything metal  Metal can cause serious injury  Tell a healthcare provider if you have any metal in or on your body  Ask your healthcare provider when you need to have an MRI  Follow up with your healthcare provider as directed:  Write down your questions so you remember to ask them during your visits  © 2017 [address] Information is for End User's use only and may not be sold, redistributed or otherwise used for commercial purposes  All illustrations and images included in CareNotes® are the copyrighted property of [employer]  or [employer]rach[employer]  The above information is an  only   It is not intended as medical advice for individual conditions or treatments  Talk to your doctor, nurse or pharmacist before following any medical regimen to see if it is safe and effective for you

## 2018-12-21 NOTE — OP NOTE
OPERATIVE REPORT  PATIENT NAME: Rayray Colon    :  1990  MRN: [id number]  Pt Location:  OR ROOM 03    SURGERY DATE: 2018    Surgeon(s) and Role:     * Awilda Palomo MD - Primary    Preop Diagnosis:  Capsular contracture of breast implant [T85 44XA]    Post-Op Diagnosis Codes:     * Capsular contracture of breast implant [T85 44XA]    Procedure(s) (LRB):  BREAST REVISION; IMPLANT EXCHANGE (Left)    Specimen(s):  * No specimens in log *    Estimated Blood Loss:   Minimal    Drains:       Anesthesia Type:   General    Operative Indications:  Capsular contracture of breast implant [T85 44XA]      Operative Findings:  Ruptured silicone implant upper outer pole    Complications:   None    Procedure and Technique:  The patient was draped and prepped in the normal sterile fashion injected with local anesthesia the old scar was excised  Incision was carried down to the capsule capsule was opened there was found to be some free silicone at the incision site and it was thought at the time that this possibly could be from a nick in the implant when entering the capsule  This was not drilled on the implant was removed the upper outer pole of the implant was ruptured  There was free silicone in the capsule  A capsulotomy was performed in lower portion of the pocket and medial portion of the pocket radial and circumferential   The wounds were irrigated with normal saline Betadine and normal saline until clear  A 118 cc mentor silicone implant was placed    The skin was closed with the 2-0 Vicryl for capsule and dermis and a running subcuticular 3-0 6 Surgipro suture for skin the patient tolerated the procedure well   I was present for the entire procedure    Patient Disposition:  PACU     SIGNATURE: Awilda Palomo MD  DATE: 2018  TIME: 11:47 AM

## 2019-02-19 ENCOUNTER — ANNUAL EXAM (OUTPATIENT)
Dept: OBGYN CLINIC | Facility: CLINIC | Age: 29
End: 2019-02-19
Payer: COMMERCIAL

## 2019-02-19 VITALS
SYSTOLIC BLOOD PRESSURE: 100 MMHG | HEIGHT: 62 IN | BODY MASS INDEX: 22.63 KG/M2 | WEIGHT: 123 LBS | DIASTOLIC BLOOD PRESSURE: 74 MMHG

## 2019-02-19 DIAGNOSIS — Z01.419 ENCNTR FOR GYN EXAM (GENERAL) (ROUTINE) W/O ABN FINDINGS: ICD-10-CM

## 2019-02-19 PROCEDURE — 87624 HPV HI-RISK TYP POOLED RSLT: CPT | Performed by: PHYSICIAN ASSISTANT

## 2019-02-19 PROCEDURE — G0145 SCR C/V CYTO,THINLAYER,RESCR: HCPCS | Performed by: PHYSICIAN ASSISTANT

## 2019-02-19 PROCEDURE — S0612 ANNUAL GYNECOLOGICAL EXAMINA: HCPCS | Performed by: PHYSICIAN ASSISTANT

## 2019-02-19 RX ORDER — DICYCLOMINE HYDROCHLORIDE 10 MG/1
CAPSULE ORAL EVERY 12 HOURS
COMMUNITY
End: 2022-02-17

## 2019-02-19 RX ORDER — ETHINYL ESTRADIOL/DROSPIRENONE 0.02-3(28)
1 TABLET ORAL DAILY
Qty: 90 TABLET | Refills: 3 | Status: SHIPPED | OUTPATIENT
Start: 2019-02-19 | End: 2020-02-21 | Stop reason: HOSPADM

## 2019-02-19 RX ORDER — OLOPATADINE HYDROCHLORIDE 665 UG/1
SPRAY NASAL
COMMUNITY
End: 2021-04-13 | Stop reason: ALTCHOICE

## 2019-02-19 NOTE — PROGRESS NOTES
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       Last Pap 2/8/17 neg      Current Outpatient Medications:     dicyclomine (BENTYL) 10 mg capsule, Every 12 hours, Disp: , Rfl:     KATARINA 3-0 02 MG per tablet, Take 1 tablet by mouth daily, Disp: 90 tablet, Rfl: 3    Olopatadine HCl (PATANASE) 0 6 % SOLN, Patanase 0 6 % nasal spray, Disp: , Rfl:   Social History     Socioeconomic History    Marital status: Single     Spouse name: Not on file    Number of children: Not on file    Years of education: Not on file    Highest education level: Not on file   Occupational History    Not on file   Social Needs    Financial resource strain: Not on file    Food insecurity:     Worry: Not on file     Inability: Not on file    Transportation needs:     Medical: Not on file     Non-medical: Not on file   Tobacco Use    Smoking status: Never Smoker    Smokeless tobacco: Never Used   Substance and Sexual Activity    Alcohol use: Yes     Frequency: 2-4 times a month     Drinks per session: 1 or 2     Binge frequency: Never     Comment: social     Drug use: No    Sexual activity: Yes     Partners: Male     Birth control/protection: Pill   Lifestyle    Physical activity:     Days per week: Not on file     Minutes per session: Not on file    Stress: Not on file   Relationships    Social connections:     Talks on phone: Not on file     Gets together: Not on file     Attends [employer] service: Not on file     Active member of club or organization: Not on file     Attends meetings of clubs or organizations: Not on file     Relationship status: Not on file    Intimate partner violence:     Fear of current or ex partner: Not on file     Emotionally abused: Not on file     Physically abused: Not on file     Forced sexual activity: Not on file   Other Topics Concern    Not on file   Social History Narrative    No caffeine use    Exercises regularly     Family History   Problem Relation Age of Onset    Arthritis Mother     Breast cancer Mother     Depression Mother     Anxiety disorder Mother     Insomnia Mother     Osteoporosis Mother     Arthritis Father     Hypertension Father     Hyperlipidemia Father     Depression Maternal Grandmother     Anxiety disorder Maternal Grandmother     Osteoporosis Maternal Grandmother     Heart disease Maternal Grandfather     Hypertension Paternal Grandmother     Heart disease Paternal Grandfather     Insomnia Sister       Past Medical History:   Diagnosis Date    Anxiety     H/O breast augmentation     H/O seasonal allergies     Migraines         O:  Blood pressure 100/74, height 5' 1 81" (1 57 m), weight 55 8 kg (123 lb), last menstrual period 01/01/2019  Patient appears well and is not in distress  Neck is supple without masses  Breasts are symmetrical without mass, tenderness, nipple discharge, skin changes or adenopathy  Bilateral implants appear intact  Abdomen is soft and nontender without masses  External genitals are normal without lesions or rashes  Vagina is normal without discharge or bleeding  Cervix is normal without discharge or lesion  Uterus is normal, mobile, nontender without palpable mass  Adnexa are normal, nontender, without palpable mass  A:  Yearly exam      P:   Pap and HPV today    Nara sent to mail order    RTO one year for yearly exam or sooner as needed

## 2019-02-21 LAB
HPV HR 12 DNA CVX QL NAA+PROBE: NEGATIVE
HPV16 DNA CVX QL NAA+PROBE: NEGATIVE
HPV18 DNA CVX QL NAA+PROBE: NEGATIVE

## 2019-02-22 LAB
LAB AP GYN PRIMARY INTERPRETATION: NORMAL
Lab: NORMAL

## 2019-05-17 ENCOUNTER — OFFICE VISIT (OUTPATIENT)
Dept: PHYSICAL THERAPY | Facility: REHABILITATION | Age: 29
End: 2019-05-17
Payer: COMMERCIAL

## 2019-05-17 DIAGNOSIS — M54.50 ACUTE RIGHT-SIDED LOW BACK PAIN WITHOUT SCIATICA: Primary | ICD-10-CM

## 2019-05-17 PROCEDURE — 97140 MANUAL THERAPY 1/> REGIONS: CPT | Performed by: PHYSICAL THERAPIST

## 2019-05-20 ENCOUNTER — OFFICE VISIT (OUTPATIENT)
Dept: PHYSICAL THERAPY | Facility: REHABILITATION | Age: 29
End: 2019-05-20
Payer: COMMERCIAL

## 2019-05-20 DIAGNOSIS — M54.50 ACUTE RIGHT-SIDED LOW BACK PAIN WITHOUT SCIATICA: Primary | ICD-10-CM

## 2019-05-20 PROCEDURE — 97140 MANUAL THERAPY 1/> REGIONS: CPT | Performed by: PHYSICAL THERAPIST

## 2019-05-20 PROCEDURE — 97110 THERAPEUTIC EXERCISES: CPT | Performed by: PHYSICAL THERAPIST

## 2019-05-29 ENCOUNTER — APPOINTMENT (OUTPATIENT)
Dept: PHYSICAL THERAPY | Facility: REHABILITATION | Age: 29
End: 2019-05-29
Payer: COMMERCIAL

## 2019-06-03 ENCOUNTER — OFFICE VISIT (OUTPATIENT)
Dept: PHYSICAL THERAPY | Facility: REHABILITATION | Age: 29
End: 2019-06-03
Payer: COMMERCIAL

## 2019-06-03 DIAGNOSIS — M54.50 ACUTE RIGHT-SIDED LOW BACK PAIN WITHOUT SCIATICA: Primary | ICD-10-CM

## 2019-06-03 PROCEDURE — 97140 MANUAL THERAPY 1/> REGIONS: CPT | Performed by: PHYSICAL THERAPIST

## 2019-06-03 PROCEDURE — 97110 THERAPEUTIC EXERCISES: CPT | Performed by: PHYSICAL THERAPIST

## 2019-06-10 ENCOUNTER — OFFICE VISIT (OUTPATIENT)
Dept: PHYSICAL THERAPY | Facility: REHABILITATION | Age: 29
End: 2019-06-10
Payer: COMMERCIAL

## 2019-06-10 DIAGNOSIS — M54.50 ACUTE RIGHT-SIDED LOW BACK PAIN WITHOUT SCIATICA: Primary | ICD-10-CM

## 2019-06-10 PROCEDURE — 97140 MANUAL THERAPY 1/> REGIONS: CPT | Performed by: PHYSICAL THERAPIST

## 2019-06-10 PROCEDURE — 97112 NEUROMUSCULAR REEDUCATION: CPT | Performed by: PHYSICAL THERAPIST

## 2019-06-19 ENCOUNTER — OFFICE VISIT (OUTPATIENT)
Dept: PHYSICAL THERAPY | Facility: REHABILITATION | Age: 29
End: 2019-06-19
Payer: COMMERCIAL

## 2019-06-19 DIAGNOSIS — M54.50 ACUTE RIGHT-SIDED LOW BACK PAIN WITHOUT SCIATICA: Primary | ICD-10-CM

## 2019-06-19 PROCEDURE — 97110 THERAPEUTIC EXERCISES: CPT | Performed by: PHYSICAL THERAPIST

## 2019-06-28 ENCOUNTER — OFFICE VISIT (OUTPATIENT)
Dept: PHYSICAL THERAPY | Facility: REHABILITATION | Age: 29
End: 2019-06-28
Payer: COMMERCIAL

## 2019-06-28 ENCOUNTER — TELEPHONE (OUTPATIENT)
Dept: OBGYN CLINIC | Facility: CLINIC | Age: 29
End: 2019-06-28

## 2019-06-28 DIAGNOSIS — M54.50 ACUTE RIGHT-SIDED LOW BACK PAIN WITHOUT SCIATICA: Primary | ICD-10-CM

## 2019-06-28 PROCEDURE — 97140 MANUAL THERAPY 1/> REGIONS: CPT | Performed by: PHYSICAL THERAPIST

## 2019-06-28 PROCEDURE — 97112 NEUROMUSCULAR REEDUCATION: CPT | Performed by: PHYSICAL THERAPIST

## 2019-07-05 ENCOUNTER — OFFICE VISIT (OUTPATIENT)
Dept: PHYSICAL THERAPY | Facility: REHABILITATION | Age: 29
End: 2019-07-05
Payer: COMMERCIAL

## 2019-07-05 DIAGNOSIS — M54.50 ACUTE RIGHT-SIDED LOW BACK PAIN WITHOUT SCIATICA: Primary | ICD-10-CM

## 2019-07-05 PROCEDURE — 97112 NEUROMUSCULAR REEDUCATION: CPT | Performed by: PHYSICAL THERAPIST

## 2019-07-05 PROCEDURE — 97140 MANUAL THERAPY 1/> REGIONS: CPT | Performed by: PHYSICAL THERAPIST

## 2019-07-05 NOTE — PROGRESS NOTES
Daily Note     Today's date: 2019  Patient name: Eden Rinne  : 1990  MRN: [id number]  Referring provider: Peter Mike DO  Dx:   Encounter Diagnosis     ICD-10-CM    1  Acute right-sided low back pain without sciatica M54 5        Start Time: 0900  Stop Time: 0930  Total time in clinic (min): 30 minutes    Subjective: Pt states she has been experiencing "nerve" pain in the right lumbar region  "Is this related to my SI?"    Objective: See treatment diary below  HEP updated with the [employer] deadbug and side planks  Assessment: Tolerated treatment well  Patient exhibited good technique with therapeutic exercises      Plan: Continue per plan of care          Precautions: not sig    Daily Treatment Diary     Manual  05/17 05/20 06/03 6/10 06/19 06/28 07      Supine tissue deformation - R psoas LMR LMR LMR   LMR       Supine tissue deformation - R hip add LMR     LMR LMR      Prone tissue deformation - R glute LMR LMR LMR          Prone - sustained R AI correction (with u/l press up) LMR            Prone gr 5 R SIJ distraction LMR            Supine gr 5 R hip distraction  LMR    Gr 4 - LMR       Prone tissue deformation - framing iliac crest  LMR           Supine gr 5 mid-thoracic mobs   LMR          Hip LAD    G5         MWM AI correction with HHR    2x8         S/l distal hip add tissue deformation        With and without knee arom - LMR                                Assessment/education    15'         S/l gr 5 right PI correction       LMR          Exercise Diary   05/20 06/03 6/10 06/19 06/28 07      Frog bridges  15"x7           U/l bridge  7" - 3x3           q-ped pelvic rocking  15           Standing - lx segmental flexion  3           Anthony curls   15# - 2x9          q-ped - le ext   3x5          q-ped - sustained row with le ext   9# - 2x9          SL hip ER    5"x10 x5 x5 2# - 3x5      SHEBOB    2x5"         HHR    20x         Prone fig-4 hip lift     x5        SLS with p-ball press into wall     3 sets        Banded air squats with u/l pumps in hole     grn - 3x9        Side stepping - band around feet      yellow - 30 ft x 4 yellow - 20 ft x 6      Banded OH squat - banded air squat      peach/yellow - 7" - 3 x3       Psoas pulls      yellow - 5x5       Crossfit deadbug       grn - 5x3      Side plank       3x5      S/l hip er - prolonged hold - with manual resistance       30"x3                       Modalities

## 2019-07-15 ENCOUNTER — OFFICE VISIT (OUTPATIENT)
Dept: PHYSICAL THERAPY | Facility: REHABILITATION | Age: 29
End: 2019-07-15
Payer: COMMERCIAL

## 2019-07-15 DIAGNOSIS — M54.50 ACUTE RIGHT-SIDED LOW BACK PAIN WITHOUT SCIATICA: Primary | ICD-10-CM

## 2019-07-15 PROCEDURE — 97110 THERAPEUTIC EXERCISES: CPT | Performed by: PHYSICAL THERAPIST

## 2019-07-15 PROCEDURE — 97140 MANUAL THERAPY 1/> REGIONS: CPT | Performed by: PHYSICAL THERAPIST

## 2019-07-15 NOTE — PROGRESS NOTES
Daily Note     Today's date: 7/15/2019  Patient name: Ovi Espinoza  : 1990  MRN: [id number]  Referring provider: Dayday Albrecht, PT  Dx:   Encounter Diagnosis     ICD-10-CM    1  Acute right-sided low back pain without sciatica M54 5        Start Time: 0800  Stop Time: 0845  Total time in clinic (min): 45 minutes    Subjective: Pt notes improvements in her hip, but states she still feels very fatigued with high repetitions of exercises during lifting  Objective: See treatment diary below  HEP updated with tall kneeling sliders for hamstrings and hip adductors  Assessment: Tolerated treatment well  Patient demonstrated fatigue post treatment      Plan: Continue per plan of care        Precautions: not sig    Daily Treatment Diary     Manual  05/17 05/20 06/03 6/10 06/19 06/28 07/05 07/15     Supine tissue deformation - R psoas LMR LMR LMR   LMR       Supine tissue deformation - R hip add LMR     LMR LMR      Prone tissue deformation - R glute LMR LMR LMR          Prone - sustained R AI correction (with u/l press up) LMR            Prone gr 5 R SIJ distraction LMR            Supine gr 5 R hip distraction  LMR    Gr 4 - LMR  Gr 4 - LMR     Prone tissue deformation - framing iliac crest  LMR           Supine gr 5 mid-thoracic mobs   LMR          Hip LAD    G5         MWM AI correction with HHR    2x8         S/l distal hip add tissue deformation        With and without knee arom - LMR LMR                               Assessment/education    15'         S/l gr 5 right PI correction       LMR          Exercise Diary   05/20 06/03 6/10 06/19 06/28 07/05 07/15     Frog bridges  15"x7           U/l bridge  7" - 3x3           q-ped pelvic rocking  15           Standing - lx segmental flexion  3           Anthony curls   15# - 2x9          q-ped - le ext   3x5          q-ped - sustained row with le ext   9# - 2x9          SL hip ER    5"x10 x5 x5 2# - 3x5      SHEBOB    2x5"         HHR    20x Prone fig-4 hip lift     x5        SLS with p-ball press into wall     3 sets        Banded air squats with u/l pumps in hole     grn - 3x9        Side stepping - band around feet      yellow - 30 ft x 4 yellow - 20 ft x 6      Banded OH squat - banded air squat      peach/yellow - 7" - 3 x3       Psoas pulls      yellow - 5x5       Crossfit deadbug       grn - 5x3      Side plank       3x5      S/l hip er - prolonged hold - with manual resistance       30"x3      Tall kneel slider hamstring with hip hinge        fatigue x 2     Tall kneel slider hip add with hip hinge        fatigue x 2     Tall kneel hamstring eccentrics        3x4     U/l hamstring curls on rower (at gym)        verbal                                                                                       Modalities

## 2019-07-25 ENCOUNTER — OFFICE VISIT (OUTPATIENT)
Dept: PHYSICAL THERAPY | Facility: REHABILITATION | Age: 29
End: 2019-07-25
Payer: COMMERCIAL

## 2019-07-25 DIAGNOSIS — M54.50 ACUTE RIGHT-SIDED LOW BACK PAIN WITHOUT SCIATICA: Primary | ICD-10-CM

## 2019-07-25 PROCEDURE — 97140 MANUAL THERAPY 1/> REGIONS: CPT | Performed by: PHYSICAL THERAPIST

## 2019-07-25 PROCEDURE — 97110 THERAPEUTIC EXERCISES: CPT | Performed by: PHYSICAL THERAPIST

## 2019-07-25 NOTE — PROGRESS NOTES
Daily Note     Today's date: 2019  Patient name: Ovi Espinoza  : 1990  MRN: [id number]  Referring provider: Dayday Albrecht PT  Dx:   Encounter Diagnosis     ICD-10-CM    1  Acute right-sided low back pain without sciatica M54 5        Start Time: 0820  Stop Time: 0850  Total time in clinic (min): 30 minutes    Subjective: Pt c/o sx's in the L/S region the past few days  She states her workout routine contained a lot of squats this week  She feels it is more than muscle soreness  Something feels "off"  Objective: See treatment diary below  HEP updated with hollow hold with banded pullover and tall kneel banded hip hinge  Pelvic alignment:  Supine leg length:  Right = shorter    Assessment: Tolerated treatment well  Patient demonstrated fatigue post treatment      Plan: Continue per plan of care  f/u in 2 5 weeks        Precautions: not sig    Daily Treatment Diary     Manual  05/17 05/20 06/03 6/10 06/19 06/28 07/05 07/15 07/25    Supine tissue deformation - R psoas LMR LMR LMR   LMR   LMR    Supine tissue deformation - R hip add LMR     LMR LMR      Prone tissue deformation - R glute LMR LMR LMR          Prone - sustained R AI correction (with u/l press up) LMR            Prone gr 5 R SIJ distraction LMR        LMR    Supine gr 5 R hip distraction  LMR    Gr 4 - LMR  Gr 4 - LMR LMR    Prone tissue deformation - framing iliac crest  LMR           Supine gr 5 mid-thoracic mobs   LMR          Hip LAD    G5         MWM AI correction with HHR    2x8         S/l distal hip add tissue deformation        With and without knee arom - LMR LMR     Prone - sustained pressure - R SIJ (with active press up)         LMR                 Assessment/education    15'         S/l gr 5 right PI correction       LMR          Exercise Diary   05/20 06/03 6/10 06/19 06/28 07/05 07/15 07/25    Frog bridges  15"x7           U/l bridge  7" - 3x3           q-ped pelvic rocking  15           Standing - lx segmental flexion  3           Anthony curls   15# - 2x9          q-ped - le ext   3x5          q-ped - sustained row with le ext   9# - 2x9          SL hip ER    5"x10 x5 x5 2# - 3x5      SHEBOB    2x5"         HHR    20x         Prone fig-4 hip lift     x5        SLS with p-ball press into wall     3 sets        Banded air squats with u/l pumps in hole     grn - 3x9        Side stepping - band around feet      yellow - 30 ft x 4 yellow - 20 ft x 6      Banded OH squat - banded air squat      peach/yellow - 7" - 3 x3       Psoas pulls      yellow - 5x5       Crossfit deadbug       grn - 5x3      Side plank       3x5      S/l hip er - prolonged hold - with manual resistance       30"x3      Tall kneel slider hamstring with hip hinge        fatigue x 2     Tall kneel slider hip add with hip hinge        fatigue x 2     Tall kneel hamstring eccentrics        3x4     U/l hamstring curls on rower (at gym)        verbal     Tall kneel banded hip hinge         cran - 2x25    Hollow hold with banded lat pull         cran - 3x5                                                            Modalities

## 2019-08-12 ENCOUNTER — OFFICE VISIT (OUTPATIENT)
Dept: PHYSICAL THERAPY | Facility: REHABILITATION | Age: 29
End: 2019-08-12
Payer: COMMERCIAL

## 2019-08-12 DIAGNOSIS — M54.50 ACUTE RIGHT-SIDED LOW BACK PAIN WITHOUT SCIATICA: Primary | ICD-10-CM

## 2019-08-12 PROCEDURE — 97112 NEUROMUSCULAR REEDUCATION: CPT | Performed by: PHYSICAL THERAPIST

## 2019-08-12 PROCEDURE — 97140 MANUAL THERAPY 1/> REGIONS: CPT | Performed by: PHYSICAL THERAPIST

## 2019-08-12 NOTE — PROGRESS NOTES
Daily Note     Today's date: 2019  Patient name: Florian Ramsey  : 1990  MRN: [id number]  Referring provider: Sherita Ambriz DO  Dx:   Encounter Diagnosis     ICD-10-CM    1  Acute right-sided low back pain without sciatica M54 5        Start Time: 920  Stop Time: 1000  Total time in clinic (min): 40 minutes    Subjective: Pt is aware of her sx's with prolonged sitting  Objective: See treatment diary below  HEP updated with bird dog rows and dead bugs with band pulls  FOTO has increased from 64 to 76 indicating improvement  Assessment: Tolerated treatment well  Patient demonstrated fatigue post treatment      Plan: Continue per plan of care  Re-eval next visit with expected d/c         Precautions: not sig    Daily Treatment Diary     Manual  05/17 05/20 06/03 6/10 06/19 06/28 07/05 07/15 07/25 08/12   Supine tissue deformation - R psoas LMR LMR LMR   LMR   LMR    Supine tissue deformation - R hip add LMR     LMR LMR      Prone tissue deformation - R glute LMR LMR LMR       LMR   Prone - sustained R AI correction (with u/l press up) LMR            Prone gr 5 R SIJ distraction LMR        LMR    Supine gr 5 R hip distraction  LMR    Gr 4 - LMR  Gr 4 - LMR LMR    Prone tissue deformation - framing iliac crest  LMR        LMR   Supine gr 5 mid-thoracic mobs   LMR          Hip LAD    G5         MWM AI correction with HHR    2x8         S/l distal hip add tissue deformation        With and without knee arom - LMR LMR     Prone - sustained pressure - R SIJ (with active press up)         LMR    Prone gr 4 mid-thoracic mobs          LMR   Assessment/education    15'      LMR   S/l gr 5 right PI correction       LMR          Exercise Diary   05/20 06/03 6/10 06/19 06/28 07/05 07/15 07/25 08/12   Frog bridges  15"x7           U/l bridge  7" - 3x3           q-ped pelvic rocking  15           Standing - lx segmental flexion  3           Anthony curls   15# - 2x9          q-ped - le ext   3x5 q-ped - sustained row with le ext   9# - 2x9          SL hip ER    5"x10 x5 x5 2# - 3x5      SHEBOB    2x5"         HHR    20x         Prone fig-4 hip lift     x5        SLS with p-ball press into wall     3 sets        Banded air squats with u/l pumps in hole     grn - 3x9        Side stepping - band around feet      yellow - 30 ft x 4 yellow - 20 ft x 6      Banded OH squat - banded air squat      peach/yellow - 7" - 3 x3       Psoas pulls      yellow - 5x5       Crossfit deadbug       grn - 5x3   With dowel - 3 rounds   Side plank       3x5      S/l hip er - prolonged hold - with manual resistance       30"x3      Tall kneel slider hamstring with hip hinge        fatigue x 2     Tall kneel slider hip add with hip hinge        fatigue x 2     Tall kneel hamstring eccentrics        3x4     U/l hamstring curls on rower (at gym)        verbal     Tall kneel banded hip hinge         cran - 2x25    Hollow hold with banded lat pull         cran - 3x5    Bird dog row          7# - 3x5 b/l   90/90 hip eccentrics          3x5 b/l   Plank - feet on p-ball          5"x3                    Modalities

## 2019-08-22 ENCOUNTER — OFFICE VISIT (OUTPATIENT)
Dept: PHYSICAL THERAPY | Facility: REHABILITATION | Age: 29
End: 2019-08-22
Payer: COMMERCIAL

## 2019-08-22 DIAGNOSIS — M54.50 ACUTE RIGHT-SIDED LOW BACK PAIN WITHOUT SCIATICA: Primary | ICD-10-CM

## 2019-08-22 PROCEDURE — 97110 THERAPEUTIC EXERCISES: CPT | Performed by: PHYSICAL THERAPIST

## 2019-08-22 NOTE — PROGRESS NOTES
Discharge/Daily Note     Today's date: 2019  Patient name: Rosie Dial  : 1990  MRN: [id number]  Referring provider: Angelita Jackman DO  Dx:   Encounter Diagnosis     ICD-10-CM    1  Acute right-sided low back pain without sciatica M54 5        Start Time: 930  Stop Time: 1000  Total time in clinic (min): 30 minutes    Subjective: Pt c/o persistent tightness in her right hip when she crosses her right leg over left  Objective: See treatment diary below  FOTO has increased from 64 to 79 indicating improvement  Limited thoracic rotation (left > right)  HEP updated with modified q-ped u/l thoracic rotation and s/l lean over foam roller  Current HEP modified and reviewed  Goals  ST - I with HEP - MET  2 - perform a functional squat without pain - MET  LT - FOTO > 78 -MET  2 - 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 with < 2/10 pain - MET   3 - run 400 meters without sx's - MET    Assessment: Tolerated treatment well  Patient exhibited good technique with therapeutic exercises and would benefit from continued PT      Plan: d/c          Precautions: not sig    Daily Treatment Diary     Manual  05/17 05/20 06/03 6/10 06/19 06/28 07/05 07/15 07/25 08/12   Supine tissue deformation - R psoas LMR LMR LMR   LMR   LMR    Supine tissue deformation - R hip add LMR     LMR LMR      Prone tissue deformation - R glute LMR LMR LMR       LMR   Prone - sustained R AI correction (with u/l press up) LMR            Prone gr 5 R SIJ distraction LMR        LMR    Supine gr 5 R hip distraction  LMR    Gr 4 - LMR  Gr 4 - LMR LMR    Prone tissue deformation - framing iliac crest  LMR        LMR   Supine gr 5 mid-thoracic mobs   LMR          Hip LAD    G5         MWM AI correction with HHR    2x8         S/l distal hip add tissue deformation        With and without knee arom - LMR LMR     Prone - sustained pressure - R SIJ (with active press up)         LMR    Prone gr 4 mid-thoracic mobs          LMR Assessment/education    15'      LMR   S/l gr 5 right PI correction       LMR          Exercise Diary   05/20 06/03 6/10 06/19 06/28 07/05 07/15 07/25 08/12   Frog bridges  15"x7           U/l bridge  7" - 3x3           q-ped pelvic rocking  15           Standing - lx segmental flexion  3           Anthony curls   15# - 2x9          q-ped - le ext   3x5          q-ped - sustained row with le ext   9# - 2x9          SL hip ER    5"x10 x5 x5 2# - 3x5      SHEBOB    2x5"         HHR    20x         Prone fig-4 hip lift     x5        SLS with p-ball press into wall     3 sets        Banded air squats with u/l pumps in hole     grn - 3x9        Side stepping - band around feet      yellow - 30 ft x 4 yellow - 20 ft x 6      Banded OH squat - banded air squat      peach/yellow - 7" - 3 x3       Psoas pulls      yellow - 5x5       Crossfit deadbug       grn - 5x3   With dowel - 3 rounds   Side plank       3x5      S/l hip er - prolonged hold - with manual resistance       30"x3      Tall kneel slider hamstring with hip hinge        fatigue x 2     Tall kneel slider hip add with hip hinge        fatigue x 2     Tall kneel hamstring eccentrics        3x4     U/l hamstring curls on rower (at gym)        verbal     Tall kneel banded hip hinge         cran - 2x25    Hollow hold with banded lat pull         cran - 3x5    Bird dog row          7# - 3x5 b/l   90/90 hip eccentrics          3x5 b/l   Plank - feet on p-ball          5"x3                    Modalities                                                                Manual  08/22                               assessment LMR                   Exercise Diary  08/22       Seated - left SB 15"x5       S/l over full foam with UE/LE reach 2'       Tall kneel banded hip hinge verbal       Banded DL grn - 2x5       Bird dog row verbal       CF deadbug verbal       q-ped u/l thor rot 15"x3       S/l over foam with UE/LE reach 2' Modalities

## 2020-02-20 ENCOUNTER — APPOINTMENT (OUTPATIENT)
Dept: LAB | Age: 30
End: 2020-02-20

## 2020-02-20 ENCOUNTER — APPOINTMENT (OUTPATIENT)
Dept: LAB | Age: 30
End: 2020-02-20
Attending: PREVENTIVE MEDICINE

## 2020-02-20 ENCOUNTER — TRANSCRIBE ORDERS (OUTPATIENT)
Dept: URGENT CARE | Age: 30
End: 2020-02-20

## 2020-02-20 ENCOUNTER — TRANSCRIBE ORDERS (OUTPATIENT)
Dept: ADMINISTRATIVE | Age: 30
End: 2020-02-20

## 2020-02-20 DIAGNOSIS — Z02.1 PRE-EMPLOYMENT HEALTH SCREENING EXAMINATION: Primary | ICD-10-CM

## 2020-02-20 DIAGNOSIS — A15.0 TB (PULMONARY TUBERCULOSIS): Primary | ICD-10-CM

## 2020-02-20 DIAGNOSIS — A15.0 TB (PULMONARY TUBERCULOSIS): ICD-10-CM

## 2020-02-20 DIAGNOSIS — Z02.1 PRE-EMPLOYMENT HEALTH SCREENING EXAMINATION: ICD-10-CM

## 2020-02-20 LAB — HBV SURFACE AB SER-ACNC: 93.64 MIU/ML

## 2020-02-20 PROCEDURE — 86480 TB TEST CELL IMMUN MEASURE: CPT

## 2020-02-20 PROCEDURE — 36415 COLL VENOUS BLD VENIPUNCTURE: CPT

## 2020-02-20 PROCEDURE — 86706 HEP B SURFACE ANTIBODY: CPT

## 2020-02-21 ENCOUNTER — ANNUAL EXAM (OUTPATIENT)
Dept: OBGYN CLINIC | Facility: CLINIC | Age: 30
End: 2020-02-21
Payer: COMMERCIAL

## 2020-02-21 VITALS
SYSTOLIC BLOOD PRESSURE: 100 MMHG | WEIGHT: 126 LBS | HEIGHT: 62 IN | DIASTOLIC BLOOD PRESSURE: 66 MMHG | BODY MASS INDEX: 23.19 KG/M2

## 2020-02-21 DIAGNOSIS — Z01.419 ENCNTR FOR GYN EXAM (GENERAL) (ROUTINE) W/O ABN FINDINGS: Primary | ICD-10-CM

## 2020-02-21 PROBLEM — E55.9 VITAMIN D DEFICIENCY: Status: ACTIVE | Noted: 2020-02-21

## 2020-02-21 LAB
GAMMA INTERFERON BACKGROUND BLD IA-ACNC: 0.01 IU/ML
M TB IFN-G BLD-IMP: NEGATIVE
M TB IFN-G CD4+ BCKGRND COR BLD-ACNC: 0.01 IU/ML
M TB IFN-G CD4+ BCKGRND COR BLD-ACNC: 0.01 IU/ML
MITOGEN IGNF BCKGRD COR BLD-ACNC: >10 IU/ML

## 2020-02-21 PROCEDURE — S0612 ANNUAL GYNECOLOGICAL EXAMINA: HCPCS | Performed by: PHYSICIAN ASSISTANT

## 2020-02-21 RX ORDER — EPINEPHRINE 0.3 MG/.3ML
INJECTION SUBCUTANEOUS
COMMUNITY

## 2020-02-21 NOTE — PROGRESS NOTES
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  Last Pap 2/19/19 neg/neg  Last Mammo never    We reviewed [facility]P guidelines for Pap testing today       Family hx of breast cancer: mother  Family hx of ovarian cancer: no  Family hx of colon cancer:  no      Current Outpatient Medications:     dicyclomine (BENTYL) 10 mg capsule, Every 12 hours, Disp: , Rfl:     KATARINA 3-0 02 MG per tablet, Take 1 tablet by mouth daily, Disp: 90 tablet, Rfl: 3    Olopatadine HCl (PATANASE) 0 6 % SOLN, Patanase 0 6 % nasal spray, Disp: , Rfl:   Social History     Socioeconomic History    Marital status: /Civil Union     Spouse name: Not on file    Number of children: Not on file    Years of education: Not on file    Highest education level: Not on file   Occupational History    Not on file   Social Needs    Financial resource strain: Not on file    Food insecurity:     Worry: Not on file     Inability: Not on file    Transportation needs:     Medical: Not on file     Non-medical: Not on file   Tobacco Use    Smoking status: Never Smoker    Smokeless tobacco: Never Used   Substance and Sexual Activity    Alcohol use: Yes     Frequency: 2-4 times a month     Drinks per session: 1 or 2     Binge frequency: Never     Comment: social     Drug use: No    Sexual activity: Yes     Partners: Male     Birth control/protection: Pill Lifestyle    Physical activity:     Days per week: Not on file     Minutes per session: Not on file    Stress: Not on file   Relationships    Social connections:     Talks on phone: Not on file     Gets together: Not on file     Attends [employer] service: Not on file     Active member of club or organization: Not on file     Attends meetings of clubs or organizations: Not on file     Relationship status: Not on file    Intimate partner violence:     Fear of current or ex partner: Not on file     Emotionally abused: Not on file     Physically abused: Not on file     Forced sexual activity: Not on file   Other Topics Concern    Not on file   Social History Narrative    No caffeine use    Exercises regularly     Family History   Problem Relation Age of Onset    Arthritis Mother     Breast cancer Mother     Depression Mother     Anxiety disorder Mother     Insomnia Mother     Osteoporosis Mother     Arthritis Father     Hypertension Father     Hyperlipidemia Father     Depression Maternal Grandmother     Anxiety disorder Maternal Grandmother     Osteoporosis Maternal Grandmother     Heart disease Maternal Grandfather     Hypertension Paternal Grandmother     Heart disease Paternal Grandfather     Insomnia Sister       Past Medical History:   Diagnosis Date    Anxiety     H/O breast augmentation     H/O seasonal allergies     Migraines         Review of Systems   Respiratory: Negative  Cardiovascular: Negative  Gastrointestinal: Negative for constipation and diarrhea  Genitourinary: Negative for difficulty urinating, pelvic pain, vaginal bleeding, vaginal discharge, itching or odor      O:  Vitals:    02/21/20 0806   BP: 100/66   BP Location: Right arm   Patient Position: Sitting   Cuff Size: Standard   Weight: 57 2 kg (126 lb)   Height: 5' 2 21" (1 58 m)         Patient appears well and is not in distress  Neck is supple without masses  Breasts are symmetrical without mass, tenderness, nipple discharge, skin changes or adenopathy  Abdomen is soft and nontender without masses  External genitals are normal without lesions or rashes  Urethral meatus and urethra are normal  Bladder is normal to palpation  Vagina is normal without discharge or bleeding  Cervix is normal without discharge or lesion  Uterus is normal, mobile, nontender without palpable mass  Adnexa are normal, nontender, without palpable mass  A:  Yearly exam      P:   Pap 2024     RTO one year for yearly exam or sooner as needed

## 2020-07-20 ENCOUNTER — OFFICE VISIT (OUTPATIENT)
Dept: PHYSICAL THERAPY | Facility: REHABILITATION | Age: 30
End: 2020-07-20
Payer: COMMERCIAL

## 2020-07-20 DIAGNOSIS — M25.551 RIGHT HIP PAIN: Primary | ICD-10-CM

## 2020-07-20 PROCEDURE — 97161 PT EVAL LOW COMPLEX 20 MIN: CPT | Performed by: PHYSICAL THERAPIST

## 2020-07-20 PROCEDURE — 97140 MANUAL THERAPY 1/> REGIONS: CPT | Performed by: PHYSICAL THERAPIST

## 2020-07-20 NOTE — LETTER
2020    Howard Cain [facility] [address]    Patient: Daniel Lynn   YOB: 1990   Date of Visit: 2020     Encounter Diagnosis     ICD-10-CM    1  Right hip pain  M25 551 CANCELED: PT plan of care cert/re-cert     CANCELED: PT plan of care cert/re-cert     CANCELED: PT plan of care cert/re-cert       Dear Dr Dorrie Phalen:    Thank you for your recent referral of Daniel Lynn  Please review the attached evaluation summary from [address] recent visit  Please verify that you agree with the plan of care by signing the attached order  If you have any questions or concerns, please do not hesitate to call  I sincerely appreciate the opportunity to share in the care of one of your patients and hope to have another opportunity to work with you in the near future  Sincerely,    Krysta Ivey, PT      Referring Provider:      I certify that I have read the below Plan of Care and certify the need for these services furnished under this plan of treatment while under my care  Howard Cain DO  [address] [place] [id number]  VIA Facsimile: [phone]          PT Evaluation     Today's date: 2020  Patient name: Daniel Lynn  : 1990  MRN: [id number]  Referring provider: Latanya Roberts PT  Dx:   Encounter Diagnosis     ICD-10-CM    1  Right hip pain M25 551        Start Time: 1650  Stop Time: 1740  Total time in clinic (min): 50 minutes    Assessment  Assessment details: 25 y/o female with c/o "tightness" in the right glute region  No known cause of injury  She denies groin sx's or other red flag sx's  No change in sx's with bowel movements or deep breathing  She has been treated recently with accupucture  She notes relief with the acupuncture, but there has been a plateau         Impairments: abnormal or restricted ROM, impaired physical strength, lacks appropriate home exercise program and pain with function    Symptom irritability: moderateUnderstanding of Dx/Px/POC: good   Prognosis: good    Goals  ST - I with HEP  2 - perform a sit-to-stand transfer with 0/10 sx's  LT - FOTO > 82  2 - perform a deep squat with 0/10 sx's  3 - lift 100# from [id number] with 0/10 sx's    Plan  Patient would benefit from: skilled physical therapy  Planned therapy interventions: manual therapy, joint mobilization, activity modification, neuromuscular re-education, patient education, stretching, strengthening, therapeutic activities, therapeutic exercise and home exercise program  Frequency: 1x week  Duration in weeks: 5  Treatment plan discussed with: patient        Subjective Evaluation    Quality of life: good    Pain  Current pain rating: 3  At best pain ratin  At worst pain ratin  Quality: tight  Aggravating factors: lifting, running, standing and walking    Social Support  Stairs in house: yes   Lives in: multiple-level home  Lives with: significant other      Diagnostic Tests  No diagnostic tests performed  Treatments  Treatments tried: accupuncture  Patient Goals  Patient goals for therapy: decreased pain, increased motion, increased strength, independence with ADLs/IADLs and return to sport/leisure activities          Objective     Palpation     Right   No palpable tenderness to the quadratus lumborum and rectus femoris  Hypertonic in the gluteus medius and iliopsoas  Tenderness of the gluteus medius and iliopsoas  Tenderness     Right Hip   Tenderness in the PSIS  No tenderness in the ASIS, iliac crest and sacroiliac joint       Neurological Testing     Sensation     Hip   Left Hip   Intact: pin prick    Right Hip   Intact: pin prick    Passive Range of Motion     Right Hip   Abduction: [facility]    Additional Passive Range of Motion Details  Right:  Flexion ~ 95  Extension ~ neutral    Strength/Myotome Testing     Right Hip   Planes of Motion   Flexion: 4+  Extension: 5  Abduction: 4+  Adduction: 4-    Tests     Right Hip   Positive long sit and piriformis  Negative Ely's, femoral nerve tension, SI compression and sign of the buttock       General Comments:      Hip Comments   FOTO = 65    Supine leg length:  Right = shorter    Inflare:  Right = 6 5 inches, left = 6 75 inches                 Precautions: none      Manuals 07/20            Tissue deformation - psoas LMR            S/l gr 5 right PI correction LMR                                      Neuro Re-Ed 07/20                                                                                                       Ther Ex 07/20            Supine - long lever - hip er [place] - 2Xf              S/l hip add (knee ext) 2x5            S/l hip add (knee flex) 2x5            hep LMR                                                                Ther Activity                                       Gait Training                                       Modalities

## 2020-07-20 NOTE — LETTER
2020    [address] [place] [address],  [phone] [facility] [address]    Patient: Tarah Oliveros   YOB: 1990   Date of Visit: 2020     Encounter Diagnosis     ICD-10-CM    1  Right hip pain  M25 551 PT plan of care cert/re-cert       Dear Dr Christin Man: Thank you for your recent referral of Tarah Oliveros  Please review the attached evaluation summary from [address] recent visit  Please verify that you agree with the plan of care by signing the attached order  If you have any questions or concerns, please do not hesitate to call  I sincerely appreciate the opportunity to share in the care of one of your patients and hope to have another opportunity to work with you in the near future  Sincerely,    [address] [place] [address], PT      Referring Provider:      I certify that I have read the below Plan of Care and certify the need for these services furnished under this plan of treatment while under my care  [address] [place], [place] [phone]  VIA [facility]          PT Evaluation     Today's date: 2020  Patient name: Tarah Oliveros  : 1990  MRN: [id number]  Referring provider: Janice Felty, PT  Dx:   Encounter Diagnosis     ICD-10-CM    1  Right hip pain M25 551        Start Time: 1650  Stop Time: 1740  Total time in clinic (min): 50 minutes    Assessment  Assessment details: 27 y/o female with c/o "tightness" in the right glute region  No known cause of injury  She denies groin sx's or other red flag sx's  No change in sx's with bowel movements or deep breathing  She has been treated recently with accupucture  She notes relief with the acupuncture, but there has been a plateau         Impairments: abnormal or restricted ROM, impaired physical strength, lacks appropriate home exercise program and pain with function    Symptom irritability: moderateUnderstanding of Dx/Px/POC: good   Prognosis: good    Goals  ST - I with HEP  2 - perform a sit-to-stand transfer with 0/10 sx's  LT - FOTO > 82  2 - perform a deep squat with 0/10 sx's  3 - lift 100# from [id number] with 0/10 sx's    Plan  Patient would benefit from: skilled physical therapy  Planned therapy interventions: manual therapy, joint mobilization, activity modification, neuromuscular re-education, patient education, stretching, strengthening, therapeutic activities, therapeutic exercise and home exercise program  Frequency: 1x week  Duration in weeks: 5  Treatment plan discussed with: patient        Subjective Evaluation    Quality of life: good    Pain  Current pain rating: 3  At best pain ratin  At worst pain ratin  Quality: tight  Aggravating factors: lifting, running, standing and walking    Social Support  Stairs in house: yes   Lives in: multiple-level home  Lives with: significant other      Diagnostic Tests  No diagnostic tests performed  Treatments  Treatments tried: accupuncture  Patient Goals  Patient goals for therapy: decreased pain, increased motion, increased strength, independence with ADLs/IADLs and return to sport/leisure activities          Objective     Palpation     Right   No palpable tenderness to the quadratus lumborum and rectus femoris  Hypertonic in the gluteus medius and iliopsoas  Tenderness of the gluteus medius and iliopsoas  Tenderness     Right Hip   Tenderness in the PSIS  No tenderness in the ASIS, iliac crest and sacroiliac joint  Neurological Testing     Sensation     Hip   Left Hip   Intact: pin prick    Right Hip   Intact: pin prick    Passive Range of Motion     Right Hip   Abduction: [facility]    Additional Passive Range of Motion Details  Right:  Flexion ~ 95  Extension ~ neutral    Strength/Myotome Testing     Right Hip   Planes of Motion   Flexion: 4+  Extension: 5  Abduction: 4+  Adduction: 4-    Tests     Right Hip   Positive long sit and piriformis     Negative Ely's, femoral nerve tension, SI compression and sign of the buttock       General Comments:      Hip Comments   FOTO = 65    Supine leg length:  Right = shorter    Inflare:  Right = 6 5 inches, left = 6 75 inches                 Precautions: none      Manuals 07/20            Tissue deformation - psoas LMR            S/l gr 5 right PI correction LMR                                      Neuro Re-Ed 07/20                                                                                                       Ther Ex 07/20            Supine - long lever - hip er [place] - 2Xf              S/l hip add (knee ext) 2x5            S/l hip add (knee flex) 2x5            hep LMR                                                                Ther Activity                                       Gait Training                                       Modalities

## 2020-07-20 NOTE — LETTER
2020    [address] [place] [address],  [phone] [facility] [address] [facility] [facility]    Patient: Shekhar Sanders   YOB: 1990   Date of Visit: 2020     Encounter Diagnosis     ICD-10-CM    1  Right hip pain  M25 551 CANCELED: PT plan of care cert/re-cert     CANCELED: PT plan of care cert/re-cert     CANCELED: PT plan of care cert/re-cert       Dear Dr Steven Petty: Thank you for your recent referral of Shekhar Sanders  Please review the attached evaluation summary from [address] recent visit  Please verify that you agree with the plan of care by signing the attached order  If you have any questions or concerns, please do not hesitate to call  I sincerely appreciate the opportunity to share in the care of one of your patients and hope to have another opportunity to work with you in the near future  Sincerely,    [address] [place] [address], PT      Referring Provider:      I certify that I have read the below Plan of Care and certify the need for these services furnished under this plan of treatment while under my care  [address] [place], [place] [phone]  VIA [facility] [place]          PT Evaluation     Today's date: 2020  Patient name: Shekhar Sanders  : 1990  MRN: [id number]  Referring provider: Maureen Espinal, PT  Dx:   Encounter Diagnosis     ICD-10-CM    1  Right hip pain M25 551        Start Time: 1650  Stop Time: 1740  Total time in clinic (min): 50 minutes    Assessment  Assessment details: 27 y/o female with c/o "tightness" in the right glute region  No known cause of injury  She denies groin sx's or other red flag sx's  No change in sx's with bowel movements or deep breathing  She has been treated recently with accupucture  She notes relief with the acupuncture, but there has been a plateau         Impairments: abnormal or restricted ROM, impaired physical strength, lacks appropriate home exercise program and pain with function    Symptom irritability: moderateUnderstanding of Dx/Px/POC: good   Prognosis: good    Goals  ST - I with HEP  2 - perform a sit-to-stand transfer with 0/10 sx's  LT - FOTO > 82  2 - perform a deep squat with 0/10 sx's  3 - lift 100# from [id number] with 0/10 sx's    Plan  Patient would benefit from: skilled physical therapy  Planned therapy interventions: manual therapy, joint mobilization, activity modification, neuromuscular re-education, patient education, stretching, strengthening, therapeutic activities, therapeutic exercise and home exercise program  Frequency: 1x week  Duration in weeks: 5  Treatment plan discussed with: patient        Subjective Evaluation    Quality of life: good    Pain  Current pain rating: 3  At best pain ratin  At worst pain ratin  Quality: tight  Aggravating factors: lifting, running, standing and walking    Social Support  Stairs in house: yes   Lives in: multiple-level home  Lives with: significant other      Diagnostic Tests  No diagnostic tests performed  Treatments  Treatments tried: accupuncture  Patient Goals  Patient goals for therapy: decreased pain, increased motion, increased strength, independence with ADLs/IADLs and return to sport/leisure activities          Objective     Palpation     Right   No palpable tenderness to the quadratus lumborum and rectus femoris  Hypertonic in the gluteus medius and iliopsoas  Tenderness of the gluteus medius and iliopsoas  Tenderness     Right Hip   Tenderness in the PSIS  No tenderness in the ASIS, iliac crest and sacroiliac joint       Neurological Testing     Sensation     Hip   Left Hip   Intact: pin prick    Right Hip   Intact: pin prick    Passive Range of Motion     Right Hip   Abduction: [facility]    Additional Passive Range of Motion Details  Right:  Flexion ~ 95  Extension ~ neutral    Strength/Myotome Testing     Right Hip   Planes of Motion   Flexion: 4+  Extension: 5  Abduction: 4+  Adduction: 4-    Tests     Right Hip   Positive long sit and piriformis  Negative Ely's, femoral nerve tension, SI compression and sign of the buttock       General Comments:      Hip Comments   FOTO = 65    Supine leg length:  Right = shorter    Inflare:  Right = 6 5 inches, left = 6 75 inches                 Precautions: none      Manuals 07/20            Tissue deformation - psoas LMR            S/l gr 5 right PI correction LMR                                      Neuro Re-Ed 07/20                                                                                                       Ther Ex 07/20            Supine - long lever - hip er [place] - 2Xf              S/l hip add (knee ext) 2x5            S/l hip add (knee flex) 2x5            hep LMR                                                                Ther Activity                                       Gait Training                                       Modalities

## 2020-07-27 ENCOUNTER — OFFICE VISIT (OUTPATIENT)
Dept: PHYSICAL THERAPY | Facility: REHABILITATION | Age: 30
End: 2020-07-27
Payer: COMMERCIAL

## 2020-07-27 DIAGNOSIS — M25.551 RIGHT HIP PAIN: Primary | ICD-10-CM

## 2020-07-27 PROCEDURE — 97110 THERAPEUTIC EXERCISES: CPT | Performed by: PHYSICAL THERAPIST

## 2020-07-27 PROCEDURE — 97140 MANUAL THERAPY 1/> REGIONS: CPT | Performed by: PHYSICAL THERAPIST

## 2020-07-27 NOTE — PROGRESS NOTES
Daily Note     Today's date: 2020  Patient name: Delfino Giron  : 1990  MRN: [id number]  Referring provider: Cameron Zuñiga, PT  Dx:   Encounter Diagnosis     ICD-10-CM    1  Right hip pain M25 551        Start Time: 1635  Stop Time: 1720  Total time in clinic (min): 45 minutes    Subjective:  Pt states her hip just doesn't feel right, especially in supine  She still feels tension in her buttock region on the right  Objective: See treatment diary below  HEP updated with [place] plank  (3x/week)  Increased tissue texture density of the right oburator internus  Decreased ROM of the right innominate in abduction  Assessment: Tolerated treatment well  Patient would benefit from continued PT      Plan: Continue per plan of care        Precautions: none      Manuals            Tissue deformation - psoas LMR            S/l gr 5 right PI correction LMR            S/l tissue deformation - obturator internus  LMR           FMT - innominate abduction  LMR           Prone tissue deformation - right coccyx P-A   LMR                                                               Neuro Re-Ed                                                                                                       Ther Ex            Supine - long lever - hip er [place] - 2Xf              S/l hip add (knee ext) 2x5            S/l hip add (knee flex) 2x5            hep LMR LMR           [place] plank  3x5                                                  Ther Activity                                       Gait Training                                       Modalities

## 2020-08-07 ENCOUNTER — OFFICE VISIT (OUTPATIENT)
Dept: PHYSICAL THERAPY | Facility: REHABILITATION | Age: 30
End: 2020-08-07
Payer: COMMERCIAL

## 2020-08-07 DIAGNOSIS — M25.551 RIGHT HIP PAIN: Primary | ICD-10-CM

## 2020-08-07 PROCEDURE — 97140 MANUAL THERAPY 1/> REGIONS: CPT | Performed by: PHYSICAL THERAPIST

## 2020-08-07 PROCEDURE — 97110 THERAPEUTIC EXERCISES: CPT | Performed by: PHYSICAL THERAPIST

## 2020-08-07 NOTE — PROGRESS NOTES
Daily Note     Today's date: 2020  Patient name: Diego New  : 1990  MRN: [id number]  Referring provider: Urbano Godwin PT  Dx:   Encounter Diagnosis     ICD-10-CM    1  Right hip pain  M25 551        Start Time: 0815  Stop Time: 0900  Total time in clinic (min): 45 minutes    Subjective:  Pt states she felt good for a few days, but her inner thigh feels heavy the past two days after front squatting  She denies any N/T  Objective: See treatment diary below  HEP updated with bear crawl to squat for mobilization  Assessment: Tolerated treatment well  Patient would benefit from continued PT    Plan: Continue per plan of care        Precautions: none      Manuals           Tissue deformation - psoas LMR            S/l gr 5 right PI correction LMR            S/l tissue deformation - obturator internus  LMR Prone - LMR          FMT - innominate abduction  LMR LMR          Prone tissue deformation - right coccyx P-A   LMR LMR          Prone gr 5 right SIJ thrust   LMR                                                 Neuro Re-Ed                                                                                                      Ther Ex           Supine - long lever - hip er [place] - 2Xf              S/l hip add (knee ext) 2x5            S/l hip add (knee flex) 2x5            hep LMR LMR LMR          [place] plank  3x5 hep          Bear crawl to squat   1'x2                                    Ther Activity                                       Gait Training                                       Modalities

## 2020-08-13 ENCOUNTER — OFFICE VISIT (OUTPATIENT)
Dept: PHYSICAL THERAPY | Facility: REHABILITATION | Age: 30
End: 2020-08-13
Payer: COMMERCIAL

## 2020-08-13 DIAGNOSIS — M25.551 RIGHT HIP PAIN: Primary | ICD-10-CM

## 2020-08-13 PROCEDURE — 97140 MANUAL THERAPY 1/> REGIONS: CPT | Performed by: PHYSICAL THERAPIST

## 2020-08-13 PROCEDURE — 97110 THERAPEUTIC EXERCISES: CPT | Performed by: PHYSICAL THERAPIST

## 2020-08-13 NOTE — PROGRESS NOTES
Daily Note     Today's date: 2020  Patient name: Leslie Danielle  : 1990  MRN: [id number]  Referring provider: Cassi Philip, PT  Dx:   Encounter Diagnosis     ICD-10-CM    1  Right hip pain  M25 551        Start Time: 1535  Stop Time: 1620  Total time in clinic (min): 45 minutes    Subjective: Pt had to drive to [place] on Tuesday (6 hours) and back home today (4 5 hours)  Her sx's are worse with prolonged sitting  Objective: See treatment diary below  HEP = deep squat holds on wall and prone fig-4 hip er lifts  Squat assessment:  Pt loses tension in the hole and overcompensates with excessive hip abduction before ascending  Assessment: Tolerated treatment well  Patient would benefit from continued PT    Plan: Continue per plan of care        Precautions: none      Manuals          Tissue deformation - psoas LMR            S/l gr 5 right PI correction LMR            S/l tissue deformation - obturator internus  LMR Prone - LMR          FMT - innominate abduction  LMR LMR          Prone tissue deformation - right coccyx P-A   LMR LMR          Prone gr 5 right SIJ thrust   LMR          S/l neurodynamics with femoral nerve glide    LMR         Supine tissue deformation - superior to pubic symphysis    LMR         Prone hip er prom    LMR         Supine - sacral wedge block - innominate abd    LMR                                                             Neuro Re-Ed                                                                                                      Ther Ex          Supine - long lever - hip er [place] - 2Xf              S/l hip add (knee ext) 2x5            S/l hip add (knee flex) 2x5            hep LMR LMR LMR LMR         [place] plank  3x5 hep          Bear crawl to squat   1'x2          Deep squat hold on wall    1'x2         Prone fig-4 hip lift    5"x5         Ther Activity Gait Training                                       Modalities

## 2020-08-20 ENCOUNTER — OFFICE VISIT (OUTPATIENT)
Dept: PHYSICAL THERAPY | Facility: REHABILITATION | Age: 30
End: 2020-08-20
Payer: COMMERCIAL

## 2020-08-20 DIAGNOSIS — M25.551 RIGHT HIP PAIN: Primary | ICD-10-CM

## 2020-08-20 PROCEDURE — 97140 MANUAL THERAPY 1/> REGIONS: CPT | Performed by: PHYSICAL THERAPIST

## 2020-08-20 NOTE — PROGRESS NOTES
Daily Note     Today's date: 2020  Patient name: Joseph Ibarra  : 1990  MRN: [id number]  Referring provider: María Barber, PT  Dx:   Encounter Diagnosis     ICD-10-CM    1  Right hip pain  M25 551        Start Time:   Stop Time: 161  Total time in clinic (min): 45 minutes    Subjective: Three days ago, she c/o "nerve pain" and could not get comfortable at night  She tried several positions, but was unable to do so  Overall, she feels she is improving  Objective: See treatment diary below  Sig improvements noted with control of heavy squats  Pt able to front squat 90% of her 1RM while maintaining tension in the bottom position  She still overcompensated with excessive hip abd/er on the ascent, but the amount occurred later in the range  Pt would benefit from a SI belt  Discussed [place] belts, especially for driving  Supine 90/90 hip prom:  Ir = symmetrical, er:  Right = limited due to tissue restrictions  Assessment: Tolerated treatment well  Patient would benefit from continued PT    Plan: Continue per plan of care        Precautions: none      Manuals         Tissue deformation - psoas LMR            S/l gr 5 right PI correction LMR            S/l tissue deformation - obturator internus  LMR Prone - LMR          FMT - innominate abduction  LMR LMR          Prone tissue deformation - right coccyx P-A   LMR LMR          Prone gr 5 right SIJ thrust   LMR          S/l neurodynamics with femoral nerve glide    LMR         Supine tissue deformation - superior to pubic symphysis    LMR         Prone hip er prom    LMR         Supine - sacral wedge block - innominate abd    LMR         Sustained pressure - distal hip add with active knee ext     LMR        Discussion - SI belt     LMR        Tissue deformation - ant hip      LMR        Supine 90/90 hip er prom     LMR                                                                         Neuro Re-Ed 07/20 07/27 08/07                                                                                                     Ther Ex 07/20 07/27 08/07 08/13 08/20        Supine - long lever - hip er [place] - 2Xf              S/l hip add (knee ext) 2x5            S/l hip add (knee flex) 2x5            hep LMR LMR LMR LMR LMR        [place] plank  3x5 hep          Bear crawl to squat   1'x2                       Prone fig-4 tissue smash - ant hip     2'        Deep squat hold on wall    1'x2         Prone fig-4 hip lift    5"x5         Ther Activity                                       Gait Training                                       Modalities

## 2020-08-28 ENCOUNTER — OFFICE VISIT (OUTPATIENT)
Dept: PHYSICAL THERAPY | Facility: REHABILITATION | Age: 30
End: 2020-08-28
Payer: COMMERCIAL

## 2020-08-28 DIAGNOSIS — M25.551 RIGHT HIP PAIN: Primary | ICD-10-CM

## 2020-08-28 PROCEDURE — 97140 MANUAL THERAPY 1/> REGIONS: CPT | Performed by: PHYSICAL THERAPIST

## 2020-08-28 PROCEDURE — 97110 THERAPEUTIC EXERCISES: CPT | Performed by: PHYSICAL THERAPIST

## 2020-08-28 NOTE — PROGRESS NOTES
Daily Note     Today's date: 2020  Patient name: Katie Mckeon  : 1990  MRN: [id number]  Referring provider: Brady Srivastava PT  Dx:   Encounter Diagnosis     ICD-10-CM    1  Right hip pain  M25 551        Start Time: 820  Stop Time: 09  Total time in clinic (min): 40 minutes    Subjective: Pt feels her squats are more controlled  Her hip feels good every other day  Objective: See treatment diary below  Measurements taken around the hips for a SI belt  HEP updated with seated banded posterior hip mobs and nerve glides (seated and long sit)  (+) slump test    Assessment: Tolerated treatment well  Patient exhibited good technique with therapeutic exercises    Plan: Continue per plan of care        Precautions: none      Manuals        Tissue deformation - psoas LMR            S/l gr 5 right PI correction LMR            S/l tissue deformation - obturator internus  LMR Prone - LMR          FMT - innominate abduction  LMR LMR          Prone tissue deformation - right coccyx P-A   LMR LMR          Prone gr 5 right SIJ thrust   LMR          S/l neurodynamics with femoral nerve glide    LMR         Supine tissue deformation - superior to pubic symphysis    LMR         Prone hip er prom    LMR         Supine - sacral wedge block - innominate abd    LMR         Sustained pressure - distal hip add with active knee ext     LMR        Discussion - SI belt     LMR LMR       Tissue deformation - ant hip      LMR        Supine 90/90 hip er prom     LMR LMR       Supine SLR nerve glides      LMR                                                           Neuro Re-Ed                                                                                                      Ther Ex        Supine - long lever - hip er [place] - 2Xf              S/l hip add (knee ext) 2x5            S/l hip add (knee flex) 2x5            hep LMR LMR LMR LMR LMR LMR       [place] plank  3x5 hep          Bear crawl to squat   1'x2          Seated - banded post hip mobs with pelvic rocking      yellow x 2', blkx2'       Long sit nerve glides      2x12       Seated slump sliders      2x12                                              Prone fig-4 tissue smash - ant hip     2'        Deep squat hold on wall    1'x2         Prone fig-4 hip lift    5"x5         Ther Activity                                       Gait Training                                       Modalities

## 2020-09-04 ENCOUNTER — OFFICE VISIT (OUTPATIENT)
Dept: PHYSICAL THERAPY | Facility: REHABILITATION | Age: 30
End: 2020-09-04
Payer: COMMERCIAL

## 2020-09-04 DIAGNOSIS — M25.551 RIGHT HIP PAIN: Primary | ICD-10-CM

## 2020-09-04 PROCEDURE — 97140 MANUAL THERAPY 1/> REGIONS: CPT | Performed by: PHYSICAL THERAPIST

## 2020-09-04 NOTE — PROGRESS NOTES
Daily Note     Today's date: 2020  Patient name: Leslie Danielle  : 1990  MRN: [id number]  Referring provider: Cassi Philip, PT  Dx:   Encounter Diagnosis     ICD-10-CM    1  Right hip pain  M25 551        Start Time: 905  Stop Time: 940  Total time in clinic (min): 35 minutes    Subjective: Pt feels extra "nervy" today  She is noticing increased tightness with the nerve glides  Pt ordered a SI belt, but it is too large  She ordered the smaller size, but it hasn't arrived yet  Pt is leaving on Tuesday for a work trip and she has to drive 5 hours each way  She is concerned about her sx's flaring up  Objective: See treatment diary below  Pt educated on the importance of core stabilization  Assessment: Tolerated treatment well  Good response to s/l lx neurodynamics  Patient would benefit from continued PT    Plan: Continue per plan of care  Consider decreasing frequency of PT to every other week depending on her sx's        Precautions: none      Manuals       Tissue deformation - psoas LMR            S/l gr 5 right PI correction LMR            S/l tissue deformation - obturator internus  LMR Prone - LMR          FMT - innominate abduction  LMR LMR          Prone tissue deformation - right coccyx P-A   LMR LMR          Prone gr 5 right SIJ thrust   LMR          S/l neurodynamics with femoral nerve glide    LMR         Supine tissue deformation - superior to pubic symphysis    LMR         Prone hip er prom    LMR         Supine - sacral wedge block - innominate abd    LMR         Sustained pressure - distal hip add with active knee ext     LMR        Discussion - SI belt     LMR LMR LMR      Tissue deformation - ant hip      LMR  LMR      Supine 90/90 hip er prom     LMR LMR       Supine SLR nerve glides      LMR       S/l nerve glides with lx gapping       LMR      Supine R SI distraction       LMR                                Neuro Re-Ed 07/20 07/27 08/07                                                                                                     Ther Ex 07/20 07/27 08/07 08/13 08/20 08/28       Supine - long lever - hip er [place] - 2Xf              S/l hip add (knee ext) 2x5            S/l hip add (knee flex) 2x5            hep LMR LMR LMR LMR LMR LMR       [place] plank  3x5 hep          Bear crawl to squat   1'x2          Seated - banded post hip mobs with pelvic rocking      yellow x 2', blkx2'       Long sit nerve glides      2x12       Seated slump sliders      2x12                                              Prone fig-4 tissue smash - ant hip     2'        Deep squat hold on wall    1'x2         Prone fig-4 hip lift    5"x5         Ther Activity                                       Gait Training                                       Modalities

## 2020-09-11 ENCOUNTER — OFFICE VISIT (OUTPATIENT)
Dept: PHYSICAL THERAPY | Facility: REHABILITATION | Age: 30
End: 2020-09-11
Payer: COMMERCIAL

## 2020-09-11 DIAGNOSIS — M25.551 RIGHT HIP PAIN: Primary | ICD-10-CM

## 2020-09-11 PROCEDURE — 97140 MANUAL THERAPY 1/> REGIONS: CPT | Performed by: PHYSICAL THERAPIST

## 2020-09-11 NOTE — PROGRESS NOTES
Daily Note     Today's date: 2020  Patient name: Sam Padron  : 1990  MRN: [id number]  Referring provider: Christina Schmitz PT  Dx:   Encounter Diagnosis     ICD-10-CM    1  Right hip pain  M25 551        Start Time: 815  Stop Time: 845  Total time in clinic (min): 30 minutes    Subjective: Pt was able to squat 85% last night without a lifting belt  However, she c/o "tightness" in the inner thigh today  She has been wearing her SI belt while driving long distances in the car  Objective: See treatment diary below  HEP updated with banded inf/med glide with weight shift and banded post glide with good morning  Poor right hip/innominate disassociation  Discussed the benefits of wearing the SI belt as much as she can t/o the day to maximize benefits  Assessment: Tolerated treatment well  Pt able to perform an air squat without pain or tightness at the end of treatment  Patient would benefit from continued PT    Plan: f/u in 1-2 weeks depending on sx's        Precautions: none      Manuals      Tissue deformation - psoas LMR            S/l gr 5 right PI correction LMR            S/l tissue deformation - obturator internus  LMR Prone - LMR          FMT - innominate abduction  LMR LMR     LMR     Prone tissue deformation - right coccyx P-A   LMR LMR          Prone gr 5 right SIJ thrust   LMR          S/l neurodynamics with femoral nerve glide    LMR         Supine tissue deformation - superior to pubic symphysis    LMR         Prone hip er prom    LMR         Supine - sacral wedge block - innominate abd    LMR         Sustained pressure - distal hip add with active knee ext     LMR        Discussion - SI belt     LMR LMR LMR LMR     Tissue deformation - ant hip      LMR  LMR      Supine 90/90 hip er prom     LMR LMR       Supine SLR nerve glides      LMR       S/l nerve glides with lx gapping       LMR      Supine R SI distraction       LMR Gr 4 inf/med hip mob        LMR                  Neuro Re-Ed 07/20 07/27 08/07                                                                                                     Ther Ex 07/20 07/27 08/07 08/13 08/20 08/28 09/11     Supine - long lever - hip er [place] - 2Xf              S/l hip add (knee ext) 2x5            S/l hip add (knee flex) 2x5            hep LMR LMR LMR LMR LMR LMR  LMR     [place] plank  3x5 hep          Bear crawl to squat   1'x2          Seated - banded post hip mobs with pelvic rocking      yellow x 2', blkx2'       Long sit nerve glides      2x12       Seated slump sliders      2x12       Stand weight shift with banded inf/med hip glide        2x15     Good morning with banded post hip glide        2x15                  Prone fig-4 tissue smash - ant hip     2'        Deep squat hold on wall    1'x2         Prone fig-4 hip lift    5"x5         Ther Activity                                       Gait Training                                       Modalities

## 2020-09-18 ENCOUNTER — OFFICE VISIT (OUTPATIENT)
Dept: PHYSICAL THERAPY | Facility: REHABILITATION | Age: 30
End: 2020-09-18
Payer: COMMERCIAL

## 2020-09-18 DIAGNOSIS — M25.551 RIGHT HIP PAIN: Primary | ICD-10-CM

## 2020-09-18 PROCEDURE — 97110 THERAPEUTIC EXERCISES: CPT | Performed by: PHYSICAL THERAPIST

## 2020-09-18 PROCEDURE — 97140 MANUAL THERAPY 1/> REGIONS: CPT | Performed by: PHYSICAL THERAPIST

## 2020-09-18 NOTE — PROGRESS NOTES
Daily Note     Today's date: 2020  Patient name: Katie Mckeon  : 1990  MRN: [id number]  Referring provider: Brady Srivastava, PT  Dx:   Encounter Diagnosis     ICD-10-CM    1  Right hip pain  M25 551        Start Time: 815  Stop Time: 845  Total time in clinic (min): 30 minutes    Subjective: Pt has been wearing her SI belt when able to the past week depending on her work schedule for travelling  She has been wearing it in the car ride  Also, she only worked out two times this past week due to travelling  She had an acupuncture visit last night and states she feels worse today  Usually, she feels better after an occupant visit  Objective: See treatment diary below  Supine 90/90 hip er prom:  Right more limited than left  HEP updated with fig-4 hip stretch with banded mobilization  Pt advised to hold all other stretching and mobilization at this point  Standing observation:  Right LE = slightly increased ER  Assessment: Tolerated treatment well  Patient would benefit from continued PT    Plan: Continue per plan of care        Precautions: none      Manuals     Tissue deformation - psoas LMR            S/l gr 5 right PI correction LMR            S/l tissue deformation - obturator internus  LMR Prone - LMR          FMT - innominate abduction  LMR LMR     LMR LMR    Prone tissue deformation - right coccyx P-A   LMR LMR          Prone gr 5 right SIJ thrust   LMR          S/l neurodynamics with femoral nerve glide    LMR         Supine tissue deformation - superior to pubic symphysis    LMR         Prone hip er prom    LMR         Supine - sacral wedge block - innominate abd    LMR         Sustained pressure - distal hip add with active knee ext     LMR        Discussion - SI belt     LMR LMR LMR LMR     Tissue deformation - ant hip      LMR  LMR      Supine 90/90 hip er prom     LMR LMR   LMR    Supine SLR nerve glides      LMR S/l nerve glides with lx gapping       LMR      Supine R SI distraction       LMR      Gr 4 inf/med hip mob        LMR                  Neuro Re-Ed 07/20 07/27 08/07                                                                                                     Ther Ex 07/20 07/27 08/07 08/13 08/20 08/28 09/11 09/18    Supine - long lever - hip er [place] - 2Xf              S/l hip add (knee ext) 2x5            S/l hip add (knee flex) 2x5            hep LMR LMR LMR LMR LMR LMR  LMR LMR    [place] plank  3x5 hep          Bear crawl to squat   1'x2          Seated - banded post hip mobs with pelvic rocking      yellow x 2', blkx2'       Long sit nerve glides      2x12       Seated slump sliders      2x12       Stand weight shift with banded inf/med hip glide        2x15     Good morning with banded post hip glide        2x15     Stand fig-4 stretch with banded mob         verbal    Prone fig-4 tissue smash - ant hip     2'        Deep squat hold on wall    1'x2         Tall kneel hip hinge         2x12    U/l kneel - hip hinge         3x5                                           Prone fig-4 hip lift    5"x5         Ther Activity                                       Gait Training                                       Modalities

## 2020-09-25 ENCOUNTER — APPOINTMENT (OUTPATIENT)
Dept: PHYSICAL THERAPY | Facility: REHABILITATION | Age: 30
End: 2020-09-25
Payer: COMMERCIAL

## 2020-09-30 ENCOUNTER — OFFICE VISIT (OUTPATIENT)
Dept: PHYSICAL THERAPY | Facility: REHABILITATION | Age: 30
End: 2020-09-30
Payer: COMMERCIAL

## 2020-09-30 DIAGNOSIS — M25.551 RIGHT HIP PAIN: Primary | ICD-10-CM

## 2020-09-30 PROCEDURE — 97140 MANUAL THERAPY 1/> REGIONS: CPT | Performed by: PHYSICAL THERAPIST

## 2020-09-30 NOTE — PROGRESS NOTES
Daily Note     Today's date: 2020  Patient name: Esteban Clifton  : 1990  MRN: [id number]  Referring provider: Lord Michaud, PT  Dx:   Encounter Diagnosis     ICD-10-CM    1  Right hip pain  M25 551        Start Time: 905  Stop Time: 935  Total time in clinic (min): 30 minutes    Subjective: Pt was away last week for work  She c/o increased sx's with the long drives (4 hours one way)  She only worked out two times last week  She states she feels worse when she doesn't workout as much  Pt feels frustrated because her progress has plateaued  She has tried massage therapy, acupuncture, chiropractors, activity modification, and physical therapy  Objective: See treatment diary below  HEP updated with right LE banded distraction  Pt advised to continue with the nerve glides (long sit and edge of table)  Supine leg length = symmetrical   Improved squatting (increased depth with no pain) after performing the manual distraction with SLR  Assessment: Tolerated treatment fair  Patient     Plan: consider holding PT and refer patient to MD for possible imaging        Precautions: none      Manuals    Tissue deformation - psoas LMR            S/l gr 5 right PI correction LMR            S/l tissue deformation - obturator internus  LMR Prone - LMR          FMT - innominate abduction  LMR LMR     LMR LMR    Prone tissue deformation - right coccyx P-A   LMR LMR          Prone gr 5 right SIJ thrust   LMR          S/l neurodynamics with femoral nerve glide    LMR         Supine tissue deformation - superior to pubic symphysis    LMR         Prone hip er prom    LMR         Supine - sacral wedge block - innominate abd    LMR         Sustained pressure - distal hip add with active knee ext     LMR        Discussion - SI belt     LMR LMR LMR LMR     Tissue deformation - ant hip      LMR  LMR   LMR   Supine 90/90 hip er prom     LMR LMR   LMR Supine SLR nerve glides      LMR    With distraction - LMR   S/l nerve glides with lx gapping       LMR   prn   Supine R SI distraction       LMR      Gr 4 inf/med hip mob        LMR     Tissue deformation - surrounding right SIJ          LMR   Neuro Re-Ed 07/20 07/27 08/07                                                                                                     Ther Ex 07/20 07/27 08/07 08/13 08/20 08/28 09/11 09/18 09/30   Supine - long lever - hip er [place] - 2Xf              S/l hip add (knee ext) 2x5            S/l hip add (knee flex) 2x5            hep LMR LMR LMR LMR LMR LMR  LMR LMR LMR   [place] plank  3x5 hep          Bear crawl to squat   1'x2          Seated - banded post hip mobs with pelvic rocking      yellow x 2', blkx2'       Long sit nerve glides      2x12       Seated slump sliders      2x12       Stand weight shift with banded inf/med hip glide        2x15     Good morning with banded post hip glide        2x15     Stand fig-4 stretch with banded mob         verbal    Prone fig-4 tissue smash - ant hip     2'        Deep squat hold on wall    1'x2         Tall kneel hip hinge         2x12    U/l kneel - hip hinge         3x5                                           Prone fig-4 hip lift    5"x5         Ther Activity                                       Gait Training                                       Modalities

## 2020-11-05 ENCOUNTER — LAB (OUTPATIENT)
Dept: LAB | Age: 30
End: 2020-11-05
Payer: COMMERCIAL

## 2020-11-05 ENCOUNTER — TRANSCRIBE ORDERS (OUTPATIENT)
Dept: ADMINISTRATIVE | Age: 30
End: 2020-11-05

## 2020-11-05 ENCOUNTER — TRANSCRIBE ORDERS (OUTPATIENT)
Dept: PHYSICAL THERAPY | Facility: REHABILITATION | Age: 30
End: 2020-11-05

## 2020-11-05 DIAGNOSIS — Z79.899 ENCOUNTER FOR LONG-TERM (CURRENT) USE OF OTHER MEDICATIONS: ICD-10-CM

## 2020-11-05 DIAGNOSIS — L70.0 ACNE VULGARIS: ICD-10-CM

## 2020-11-05 DIAGNOSIS — N92.4 PREMENOPAUSAL MENORRHAGIA: ICD-10-CM

## 2020-11-05 DIAGNOSIS — L64.9 PATTERN BALDNESS: ICD-10-CM

## 2020-11-05 DIAGNOSIS — L64.9 MALE PATTERN ALOPECIA: ICD-10-CM

## 2020-11-05 DIAGNOSIS — Z79.899 ENCOUNTER FOR LONG-TERM (CURRENT) USE OF OTHER MEDICATIONS: Primary | ICD-10-CM

## 2020-11-05 DIAGNOSIS — M25.551 RIGHT HIP PAIN: Primary | ICD-10-CM

## 2020-11-05 LAB
ALBUMIN SERPL BCP-MCNC: 3.7 G/DL (ref 3.5–5)
ALP SERPL-CCNC: 51 U/L (ref 46–116)
ALT SERPL W P-5'-P-CCNC: 28 U/L (ref 12–78)
ANION GAP SERPL CALCULATED.3IONS-SCNC: 5 MMOL/L (ref 4–13)
AST SERPL W P-5'-P-CCNC: 21 U/L (ref 5–45)
BASOPHILS # BLD AUTO: 0.06 THOUSANDS/ΜL (ref 0–0.1)
BASOPHILS NFR BLD AUTO: 1 % (ref 0–1)
BILIRUB SERPL-MCNC: 0.52 MG/DL (ref 0.2–1)
BUN SERPL-MCNC: 15 MG/DL (ref 5–25)
CALCIUM SERPL-MCNC: 8.9 MG/DL (ref 8.3–10.1)
CHLORIDE SERPL-SCNC: 109 MMOL/L (ref 100–108)
CO2 SERPL-SCNC: 25 MMOL/L (ref 21–32)
CREAT SERPL-MCNC: 1 MG/DL (ref 0.6–1.3)
EOSINOPHIL # BLD AUTO: 0.08 THOUSAND/ΜL (ref 0–0.61)
EOSINOPHIL NFR BLD AUTO: 2 % (ref 0–6)
ERYTHROCYTE [DISTWIDTH] IN BLOOD BY AUTOMATED COUNT: 11.9 % (ref 11.6–15.1)
FSH SERPL-ACNC: 2.8 MIU/ML
GFR SERPL CREATININE-BSD FRML MDRD: 75 ML/MIN/1.73SQ M
GLUCOSE SERPL-MCNC: 92 MG/DL (ref 65–140)
HCT VFR BLD AUTO: 39.6 % (ref 34.8–46.1)
HGB BLD-MCNC: 12.9 G/DL (ref 11.5–15.4)
IMM GRANULOCYTES # BLD AUTO: 0 THOUSAND/UL (ref 0–0.2)
IMM GRANULOCYTES NFR BLD AUTO: 0 % (ref 0–2)
LH SERPL-ACNC: 4.3 MIU/ML
LYMPHOCYTES # BLD AUTO: 2.22 THOUSANDS/ΜL (ref 0.6–4.47)
LYMPHOCYTES NFR BLD AUTO: 50 % (ref 14–44)
MCH RBC QN AUTO: 30 PG (ref 26.8–34.3)
MCHC RBC AUTO-ENTMCNC: 32.6 G/DL (ref 31.4–37.4)
MCV RBC AUTO: 92 FL (ref 82–98)
MONOCYTES # BLD AUTO: 0.47 THOUSAND/ΜL (ref 0.17–1.22)
MONOCYTES NFR BLD AUTO: 11 % (ref 4–12)
NEUTROPHILS # BLD AUTO: 1.56 THOUSANDS/ΜL (ref 1.85–7.62)
NEUTS SEG NFR BLD AUTO: 36 % (ref 43–75)
NRBC BLD AUTO-RTO: 0 /100 WBCS
PLATELET # BLD AUTO: 339 THOUSANDS/UL (ref 149–390)
PMV BLD AUTO: 10.1 FL (ref 8.9–12.7)
POTASSIUM SERPL-SCNC: 4 MMOL/L (ref 3.5–5.3)
PROLACTIN SERPL-MCNC: 19.3 NG/ML
PROT SERPL-MCNC: 6.7 G/DL (ref 6.4–8.2)
RBC # BLD AUTO: 4.3 MILLION/UL (ref 3.81–5.12)
SODIUM SERPL-SCNC: 139 MMOL/L (ref 136–145)
WBC # BLD AUTO: 4.39 THOUSAND/UL (ref 4.31–10.16)

## 2020-11-05 PROCEDURE — 80053 COMPREHEN METABOLIC PANEL: CPT

## 2020-11-05 PROCEDURE — 82627 DEHYDROEPIANDROSTERONE: CPT

## 2020-11-05 PROCEDURE — 83002 ASSAY OF GONADOTROPIN (LH): CPT

## 2020-11-05 PROCEDURE — 84403 ASSAY OF TOTAL TESTOSTERONE: CPT

## 2020-11-05 PROCEDURE — 85025 COMPLETE CBC W/AUTO DIFF WBC: CPT

## 2020-11-05 PROCEDURE — 84270 ASSAY OF SEX HORMONE GLOBUL: CPT

## 2020-11-05 PROCEDURE — 84402 ASSAY OF FREE TESTOSTERONE: CPT

## 2020-11-05 PROCEDURE — 83498 ASY HYDROXYPROGESTERONE 17-D: CPT

## 2020-11-05 PROCEDURE — 36415 COLL VENOUS BLD VENIPUNCTURE: CPT

## 2020-11-05 PROCEDURE — 84146 ASSAY OF PROLACTIN: CPT

## 2020-11-05 PROCEDURE — 83001 ASSAY OF GONADOTROPIN (FSH): CPT

## 2020-11-05 PROCEDURE — 82157 ASSAY OF ANDROSTENEDIONE: CPT

## 2020-11-05 PROCEDURE — 82154 ANDROSTANEDIOL GLUCURONIDE: CPT

## 2020-11-06 LAB
DHEA-S SERPL-MCNC: 207 UG/DL (ref 84.8–378)
SHBG SERPL-SCNC: 131 NMOL/L (ref 24.6–122)
TESTOST FREE SERPL-MCNC: 1.7 PG/ML (ref 0–4.2)
TESTOST SERPL-MCNC: 20 NG/DL (ref 8–48)

## 2020-11-12 LAB — ANDROST SERPL-MCNC: 134 NG/DL (ref 41–262)

## 2020-11-13 LAB — 17OHP SERPL-MCNC: 113 NG/DL

## 2020-11-17 LAB — 3A-DIOL G SER-MCNC: 84 NG/DL

## 2020-12-28 ENCOUNTER — IMMUNIZATIONS (OUTPATIENT)
Dept: FAMILY MEDICINE CLINIC | Facility: HOSPITAL | Age: 30
End: 2020-12-28
Payer: COMMERCIAL

## 2020-12-28 DIAGNOSIS — Z23 ENCOUNTER FOR IMMUNIZATION: ICD-10-CM

## 2020-12-28 PROCEDURE — 0011A SARS-COV-2 / COVID-19 MRNA VACCINE (MODERNA) 100 MCG: CPT

## 2020-12-28 PROCEDURE — 91301 SARS-COV-2 / COVID-19 MRNA VACCINE (MODERNA) 100 MCG: CPT

## 2021-01-25 ENCOUNTER — IMMUNIZATIONS (OUTPATIENT)
Dept: FAMILY MEDICINE CLINIC | Facility: HOSPITAL | Age: 31
End: 2021-01-25

## 2021-01-25 DIAGNOSIS — Z23 ENCOUNTER FOR IMMUNIZATION: Primary | ICD-10-CM

## 2021-01-25 PROCEDURE — 0012A SARS-COV-2 / COVID-19 MRNA VACCINE (MODERNA) 100 MCG: CPT

## 2021-01-25 PROCEDURE — 91301 SARS-COV-2 / COVID-19 MRNA VACCINE (MODERNA) 100 MCG: CPT

## 2021-04-13 ENCOUNTER — ANNUAL EXAM (OUTPATIENT)
Dept: OBGYN CLINIC | Facility: CLINIC | Age: 31
End: 2021-04-13
Payer: COMMERCIAL

## 2021-04-13 VITALS
HEIGHT: 62 IN | WEIGHT: 127.2 LBS | DIASTOLIC BLOOD PRESSURE: 74 MMHG | BODY MASS INDEX: 23.41 KG/M2 | SYSTOLIC BLOOD PRESSURE: 120 MMHG

## 2021-04-13 DIAGNOSIS — Z01.419 ENCNTR FOR GYN EXAM (GENERAL) (ROUTINE) W/O ABN FINDINGS: Primary | ICD-10-CM

## 2021-04-13 PROCEDURE — S0612 ANNUAL GYNECOLOGICAL EXAMINA: HCPCS | Performed by: PHYSICIAN ASSISTANT

## 2021-04-13 RX ORDER — MINOCYCLINE HYDROCHLORIDE 100 MG/1
100 CAPSULE ORAL ONCE
COMMUNITY
Start: 2021-01-14 | End: 2022-02-17

## 2021-04-13 NOTE — PROGRESS NOTES
Barrera Pike  1990      CC:  Yearly exam    S:  26 y o  female here for yearly exam  Her cycles are regular, not heavy or crampy  She did have some irregular bleeding following her steroid injection and a COVID vaccine  Sexual activity: She is sexually active without pain, bleeding or dryness  Contraception: She uses condoms for contraception  She is 95% sure she does not want children in the future  Last Pap 2/19/19 neg/neg  Last Mammo never    We reviewed ASC guidelines for Pap testing today       Family hx of breast cancer: mother  Family hx of ovarian cancer: no  Family hx of colon cancer: no      Current Outpatient Medications:     CANNABIDIOL PO, cannabidiol (CBD) extract  Take 1000mg daily, Disp: , Rfl:     dicyclomine (BENTYL) 10 mg capsule, Every 12 hours, Disp: , Rfl:     EPINEPHrine (EPIPEN) 0 3 mg/0 3 mL SOAJ, epinephrine 0 3 mg/0 3 mL injection, auto-injector  USE AS DIRECTED, Disp: , Rfl:     minocycline (MINOCIN) 100 mg capsule, Take 100 mg by mouth once , Disp: , Rfl:     Oxymetazoline HCl (NASAL SPRAY 12 HOUR NA), into each nostril, Disp: , Rfl:   Social History     Socioeconomic History    Marital status: /Civil Union     Spouse name: Not on file    Number of children: Not on file    Years of education: Not on file    Highest education level: Not on file   Occupational History    Not on file   Social Needs    Financial resource strain: Not on file    Food insecurity     Worry: Not on file     Inability: Not on file   [employer] needs     Medical: Not on file     Non-medical: Not on file   Tobacco Use    Smoking status: Never Smoker    Smokeless tobacco: Never Used   Substance and Sexual Activity    Alcohol use: Yes     Frequency: 2-4 times a month     Drinks per session: 1 or 2     Binge frequency: Never     Comment: social     Drug use: No    Sexual activity: Yes     Partners: Male     Birth control/protection: Condom Male   Lifestyle    Physical activity     Days per week: Not on file     Minutes per session: Not on file    Stress: Not on file   Relationships    Social connections     Talks on phone: Not on file     Gets together: Not on file     Attends [employer] service: Not on file     Active member of club or organization: Not on file     Attends meetings of clubs or organizations: Not on file     Relationship status: Not on file    Intimate partner violence     Fear of current or ex partner: Not on file     Emotionally abused: Not on file     Physically abused: Not on file     Forced sexual activity: Not on file   Other Topics Concern    Not on file   Social History Narrative    No caffeine use    Exercises regularly     Family History   Problem Relation Age of Onset    Arthritis Mother     Breast cancer Mother     Depression Mother     Anxiety disorder Mother     Insomnia Mother     Osteoporosis Mother     Arthritis Father     Hypertension Father     Hyperlipidemia Father     Depression Maternal Grandmother     Anxiety disorder Maternal Grandmother     Osteoporosis Maternal Grandmother     Heart disease Maternal Grandfather     Hypertension Paternal Grandmother     Heart disease Paternal Grandfather     Insomnia Sister       Past Medical History:   Diagnosis Date    Anxiety     H/O breast augmentation     H/O seasonal allergies     Migraines         Review of Systems   Respiratory: Negative  Cardiovascular: Negative  Gastrointestinal: Negative for constipation and diarrhea  Genitourinary: Negative for difficulty urinating, pelvic pain, vaginal bleeding, vaginal discharge, itching or odor  O:  Blood pressure 120/74, height 5' 1 81" (1 57 m), weight 57 7 kg (127 lb 3 2 oz), last menstrual period 03/27/2021  Patient appears well and is not in distress  Neck is supple without masses  Breasts are symmetrical without mass, tenderness, nipple discharge, skin changes or adenopathy     Abdomen is soft and nontender without masses  External genitals are normal without lesions or rashes  Urethral meatus and urethra are normal  Bladder is normal to palpation  Vagina is normal without discharge or bleeding  Cervix is normal without discharge or lesion  Uterus is normal, mobile, nontender without palpable mass  Adnexa are normal, nontender, without palpable mass  A:  Yearly exam      P:   Pap 2024     RTO one year for yearly exam or sooner as needed

## 2021-12-23 ENCOUNTER — APPOINTMENT (OUTPATIENT)
Dept: LAB | Age: 31
End: 2021-12-23
Payer: COMMERCIAL

## 2021-12-23 DIAGNOSIS — R53.83 OTHER FATIGUE: ICD-10-CM

## 2021-12-23 DIAGNOSIS — M25.50 PAIN IN JOINT, MULTIPLE SITES: ICD-10-CM

## 2021-12-23 DIAGNOSIS — Z11.59 SCREENING EXAMINATION FOR POLIOMYELITIS: ICD-10-CM

## 2021-12-23 DIAGNOSIS — Z13.220 SCREENING FOR LIPOID DISORDERS: ICD-10-CM

## 2021-12-23 LAB
25(OH)D3 SERPL-MCNC: 34.6 NG/ML (ref 30–100)
ALBUMIN SERPL BCP-MCNC: 3.7 G/DL (ref 3.5–5)
ALP SERPL-CCNC: 44 U/L (ref 46–116)
ALT SERPL W P-5'-P-CCNC: 33 U/L (ref 12–78)
ANION GAP SERPL CALCULATED.3IONS-SCNC: 6 MMOL/L (ref 4–13)
AST SERPL W P-5'-P-CCNC: 23 U/L (ref 5–45)
BASOPHILS # BLD AUTO: 0.04 THOUSANDS/ΜL (ref 0–0.1)
BASOPHILS NFR BLD AUTO: 1 % (ref 0–1)
BILIRUB SERPL-MCNC: 0.84 MG/DL (ref 0.2–1)
BUN SERPL-MCNC: 13 MG/DL (ref 5–25)
CALCIUM SERPL-MCNC: 8.5 MG/DL (ref 8.3–10.1)
CHLORIDE SERPL-SCNC: 107 MMOL/L (ref 100–108)
CHOLEST SERPL-MCNC: 172 MG/DL
CO2 SERPL-SCNC: 25 MMOL/L (ref 21–32)
CREAT SERPL-MCNC: 0.86 MG/DL (ref 0.6–1.3)
CRP SERPL QL: <3 MG/L
EOSINOPHIL # BLD AUTO: 0.03 THOUSAND/ΜL (ref 0–0.61)
EOSINOPHIL NFR BLD AUTO: 1 % (ref 0–6)
ERYTHROCYTE [DISTWIDTH] IN BLOOD BY AUTOMATED COUNT: 12.3 % (ref 11.6–15.1)
ERYTHROCYTE [SEDIMENTATION RATE] IN BLOOD: 1 MM/HOUR (ref 0–19)
FERRITIN SERPL-MCNC: 46 NG/ML (ref 8–388)
GFR SERPL CREATININE-BSD FRML MDRD: 89 ML/MIN/1.73SQ M
GLUCOSE P FAST SERPL-MCNC: 94 MG/DL (ref 65–99)
HCT VFR BLD AUTO: 42.2 % (ref 34.8–46.1)
HCV AB SER QL: NORMAL
HDLC SERPL-MCNC: 78 MG/DL
HGB BLD-MCNC: 14.1 G/DL (ref 11.5–15.4)
IMM GRANULOCYTES # BLD AUTO: 0.01 THOUSAND/UL (ref 0–0.2)
IMM GRANULOCYTES NFR BLD AUTO: 0 % (ref 0–2)
IRON SATN MFR SERPL: 36 % (ref 15–50)
IRON SERPL-MCNC: 141 UG/DL (ref 50–170)
LDLC SERPL CALC-MCNC: 86 MG/DL (ref 0–100)
LYMPHOCYTES # BLD AUTO: 1.6 THOUSANDS/ΜL (ref 0.6–4.47)
LYMPHOCYTES NFR BLD AUTO: 39 % (ref 14–44)
MCH RBC QN AUTO: 30.7 PG (ref 26.8–34.3)
MCHC RBC AUTO-ENTMCNC: 33.4 G/DL (ref 31.4–37.4)
MCV RBC AUTO: 92 FL (ref 82–98)
MONOCYTES # BLD AUTO: 0.47 THOUSAND/ΜL (ref 0.17–1.22)
MONOCYTES NFR BLD AUTO: 11 % (ref 4–12)
NEUTROPHILS # BLD AUTO: 1.97 THOUSANDS/ΜL (ref 1.85–7.62)
NEUTS SEG NFR BLD AUTO: 48 % (ref 43–75)
NONHDLC SERPL-MCNC: 94 MG/DL
NRBC BLD AUTO-RTO: 0 /100 WBCS
PLATELET # BLD AUTO: 288 THOUSANDS/UL (ref 149–390)
PMV BLD AUTO: 9.7 FL (ref 8.9–12.7)
POTASSIUM SERPL-SCNC: 4 MMOL/L (ref 3.5–5.3)
PROT SERPL-MCNC: 6.7 G/DL (ref 6.4–8.2)
RBC # BLD AUTO: 4.59 MILLION/UL (ref 3.81–5.12)
SODIUM SERPL-SCNC: 138 MMOL/L (ref 136–145)
TIBC SERPL-MCNC: 397 UG/DL (ref 250–450)
TRIGL SERPL-MCNC: 39 MG/DL
TSH SERPL DL<=0.05 MIU/L-ACNC: 1.74 UIU/ML (ref 0.36–3.74)
WBC # BLD AUTO: 4.12 THOUSAND/UL (ref 4.31–10.16)

## 2021-12-23 PROCEDURE — 83550 IRON BINDING TEST: CPT

## 2021-12-23 PROCEDURE — 86803 HEPATITIS C AB TEST: CPT

## 2021-12-23 PROCEDURE — 82728 ASSAY OF FERRITIN: CPT

## 2021-12-23 PROCEDURE — 85652 RBC SED RATE AUTOMATED: CPT

## 2021-12-23 PROCEDURE — 36415 COLL VENOUS BLD VENIPUNCTURE: CPT

## 2021-12-23 PROCEDURE — 85025 COMPLETE CBC W/AUTO DIFF WBC: CPT

## 2021-12-23 PROCEDURE — 86140 C-REACTIVE PROTEIN: CPT

## 2021-12-23 PROCEDURE — 80061 LIPID PANEL: CPT

## 2021-12-23 PROCEDURE — 84443 ASSAY THYROID STIM HORMONE: CPT

## 2021-12-23 PROCEDURE — 83540 ASSAY OF IRON: CPT

## 2021-12-23 PROCEDURE — 80053 COMPREHEN METABOLIC PANEL: CPT

## 2021-12-23 PROCEDURE — 82306 VITAMIN D 25 HYDROXY: CPT

## 2022-01-22 ENCOUNTER — APPOINTMENT (OUTPATIENT)
Dept: LAB | Age: 32
End: 2022-01-22
Payer: COMMERCIAL

## 2022-01-22 DIAGNOSIS — R53.83 OTHER FATIGUE: ICD-10-CM

## 2022-01-22 DIAGNOSIS — M25.50 PAIN IN JOINT, MULTIPLE SITES: ICD-10-CM

## 2022-01-22 LAB — CORTIS AM PEAK SERPL-MCNC: 20.2 UG/DL (ref 4.2–22.4)

## 2022-01-22 PROCEDURE — 82533 TOTAL CORTISOL: CPT

## 2022-01-24 ENCOUNTER — TELEPHONE (OUTPATIENT)
Dept: OBGYN CLINIC | Facility: CLINIC | Age: 32
End: 2022-01-24

## 2022-01-24 NOTE — TELEPHONE ENCOUNTER
Spoke to pt and she is taking  megafood PNV multimulti- with 600mcg folate  Tumeric - 320mg and 80mg daily  VIT D 1000 daily  ligaplex2- collogen extracts ( this is the one she has questions on),  Protein paleo powder  Has a nurtionist and wants to be sure these are ok to continue to take

## 2022-01-26 ENCOUNTER — TELEPHONE (OUTPATIENT)
Dept: OBGYN CLINIC | Facility: CLINIC | Age: 32
End: 2022-01-26

## 2022-01-26 NOTE — TELEPHONE ENCOUNTER
----- Message from Paula Li sent at 1/26/2022 10:50 AM EST -----  Regarding: supplement  I forgot to include in my last message, can I continue my bi-weekly allergy shots? I have considerable environmental allergies and have been getting these injections for about 15years  Thanks!

## 2022-02-17 ENCOUNTER — ULTRASOUND (OUTPATIENT)
Dept: OBGYN CLINIC | Facility: CLINIC | Age: 32
End: 2022-02-17
Payer: COMMERCIAL

## 2022-02-17 VITALS
DIASTOLIC BLOOD PRESSURE: 70 MMHG | WEIGHT: 128.4 LBS | SYSTOLIC BLOOD PRESSURE: 116 MMHG | HEIGHT: 62 IN | BODY MASS INDEX: 23.63 KG/M2

## 2022-02-17 DIAGNOSIS — N91.1 SECONDARY AMENORRHEA: Primary | ICD-10-CM

## 2022-02-17 PROCEDURE — 76817 TRANSVAGINAL US OBSTETRIC: CPT | Performed by: NURSE PRACTITIONER

## 2022-02-17 NOTE — ASSESSMENT & PLAN NOTE
Samuel Goodman  is a 33 y o  Michelle Parish who presents for early ultrasound  Single IUP @ 7w3d consistent with LMP of 12/22/21 and MATT of 9/28/22 (8w1d by dates)  Per ACOG calculator use MATT of 9/28/22  Unplanned but welcome pregnancy  Having cramping and indigestion  Wants only natural supplements  Works with nutritionist and exercises daily  Will schedule OB intake and prenatal one  Encouraged to continue PNV  Discussed avoiding teratogens  Had flu and COVID vaccines  To notify us with any bleeding or pelvic pain  Verbalized understanding

## 2022-02-17 NOTE — PROGRESS NOTES
Assessment/Plan:    Secondary amenorrhea    Gabby Wills  is a 33 y o  Ashley Ha who presents for early ultrasound  Single IUP @ 7w3d consistent with LMP of 12/22/21 and MATT of 9/28/22 (8w1d by dates)  Per ACOG calculator use MATT of 9/28/22  Unplanned but welcome pregnancy  Having cramping and indigestion  Wants only natural supplements  Works with nutritionist and exercises daily  Will schedule OB intake and prenatal one  Encouraged to continue PNV  Discussed avoiding teratogens  Had flu and COVID vaccines  To notify us with any bleeding or pelvic pain  Verbalized understanding  Diagnoses and all orders for this visit:    Secondary amenorrhea  -     Ambulatory Referral to Maternal Fetal Medicine; Future    Other orders  -     Prenatal Vit-Fe Fumarate-FA (PRENATAL VITAMIN PO); Take by mouth        Subjective:      Patient ID: Nadira Cordova is a 33 y o  female  Cindy Minaya EARLY PREGNANCY ULTRASOUND     Ultrasound Probe Disinfection     A transvaginal ultrasound was performed  Prior to use, disinfection was performed with High Level Disinfection Process (avVenta)  Probe serial number SLOGA-[id number]: [id number] was used     Rudy MAGALLON  2/17/22        SUBJECTIVE     HPI: Nadira Cordova is a 33 y o  female here today for early pregnancy ultrasound  Patient's last menstrual period was 12/22/21 (exact date)  Menses are regular  OB history is significant for:   # 1 current   Medical history is significant for: none  Taking a prenatal vitamin  Allergies:  Sulfa            OBJECTIVE  /70 (BP Location: Right arm, Patient Position: Sitting, Cuff Size: Standard)   Ht 5' 2" (1 575 m)   Wt 58 2 kg (128 lb 6 4 oz)   LMP 12/22/2021   BMI 23 48 kg/m²        Early OB Ultrasound Procedure Note: Transvaginal US     Technician: Study performed by the interpreting CRNP     Indications:  Early gestation, dating & viability     Procedure Details   The entire study was done at settings of 6 0 to 8 0 MHz  Gestational Sac: Present  Yolk sac: Present -? Slightly enlarged   Crown-rump length is 1 28 cm and calculates to an estimated gestational age of 7 weeks, 3 days  Embryonic cardiac activity is seen at a rate of 145 b/min    Description of fetal anatomy Normal     Cul-de-sac: no fluid  Left ovary: not appreciated  Right ovary: not appreciated     Findings:  Viable, [place] intrauterine pregnancy        ASSESSMENT  Early pregnancy at 8 weeks 1 day with a calculated MATT of 9/28/22  based on LMP consistent with today's ultrasound

## 2022-02-17 NOTE — PATIENT INSTRUCTIONS
Pregnancy at 7 to Rene 60:   What changes are happening with my body? Pregnancy hormones may cause your body to go through many changes during this stage of your pregnancy  You may feel more tired than usual, and have mood swings, nausea and vomiting, and headaches  You may gain or lose some weight  Your breasts may feel tender and swollen and you may urinate more often  You may have cravings for certain foods or dislike of foods you normally eat  You may also have heartburn or constipation  How do I care for myself at this stage of my pregnancy? · Manage nausea and vomiting  Avoid fatty and spicy foods  Eat small meals throughout the day instead of large meals  Melisa may help to decrease nausea  Ask your healthcare provider about other ways of decreasing nausea and vomiting  · Eat a variety of healthy foods  Healthy foods include fruits, vegetables, whole-grain breads, low-fat dairy foods, beans, lean meats, and fish  Drink liquids as directed  Ask how much liquid to drink each day and which liquids are best for you  Limit caffeine to less than 200 milligrams each day  Limit your intake of fish to 2 servings each week  Choose fish low in mercury such as canned light tuna, shrimp, salmon, cod, or tilapia  Do not  eat fish high in mercury such as swordfish, tilefish, rosanna mackerel, and shark  · Take prenatal vitamins as directed  Your need for certain vitamins and minerals, such as folic acid, increases during pregnancy  Prenatal vitamins provide some of the extra vitamins and minerals you need  Prenatal vitamins may also help to decrease the risk of certain birth defects  · Ask how much weight you should gain each month  Too much or too little weight gain can be unhealthy for you and your baby  · Talk to your healthcare provider about exercise  Moderate exercise can help you stay fit   Your healthcare provider will help you plan an exercise program that is safe for you during pregnancy  · Do not smoke  Smoking increases your risk of a miscarriage and other health problems during your pregnancy  Smoking can cause your baby to be born too early or weigh less at birth  Quit smoking as soon as you think you might be pregnant  Ask your healthcare provider for information if you need help quitting  · Do not drink alcohol  Alcohol passes from your body to your baby through the placenta  It can affect your baby's brain development and cause fetal alcohol syndrome (FAS)  FAS is a group of conditions that causes mental, behavior, and growth problems  · Talk to your healthcare provider before you take any medicines  Many medicines may harm your baby if you take them when you are pregnant  Do not take any medicines, vitamins, herbs, or supplements without first talking to your healthcare provider  Never use illegal or street drugs (such as marijuana or cocaine) while you are pregnant  What are some safety tips during pregnancy? · Avoid hot tubs and saunas  Do not use a hot tub or sauna while you are pregnant, especially during your first trimester  Hot tubs and saunas may raise your baby's temperature and increase the risk of birth defects  · Avoid toxoplasmosis  This is an infection caused by eating raw meat or being around infected cat feces  It can cause birth defects, miscarriages, and other problems  Wash your hands after you touch raw meat  Make sure any meat is well-cooked before you eat it  Avoid raw eggs and unpasteurized milk  Use gloves or ask someone else to clean your cat's litter box while you are pregnant  What changes are happening with my baby? By 10 weeks, your baby will be about 2½ inches long from the top of the head to the rump (baby's bottom)  Your baby weighs about ½ ounce  Major body organs, such as the brain, heart, and lungs, are forming  Your baby's facial features are also starting to form    What do I need to know about prenatal care? Prenatal care is a series of visits with your healthcare provider throughout your pregnancy  During the first 28 weeks of your pregnancy, you will see your healthcare provider 1 time each month  Prenatal care can help prevent problems during pregnancy and childbirth  Your healthcare provider will check your blood pressure and weight  Your baby's heart rate will also be checked  You may also need the following at some visits:  · A pelvic exam  allows your healthcare provider to see your cervix (the bottom part of your uterus)  Your healthcare provider will use a speculum to open your vagina  He or she will check the size and shape of your uterus  You may also have a Pap smear at your first prenatal visit  This is a test to check your cervix for abnormal cells  · Blood tests  may be done to check for any of the following:     ? Gestational diabetes or anemia (low iron level)    ? Blood type or Rh factor, or certain birth defects    ? Immunity to certain diseases, such as chickenpox or rubella    ? An infection, such as a sexually transmitted infection, HIV, or hepatitis B    · Hepatitis B  may need to be prevented or treated  Hepatitis B is inflammation of the liver caused by the hepatitis B virus (HBV)  HBV can spread from a mother to her baby during delivery  You will be checked for HBV as early as possible in the first trimester of each pregnancy  You need the test even if you received the hepatitis B vaccine or were tested before  You may need to have an HBV infection treated before you give birth  · Urine tests  may also be done to check for sugar and protein  These can be signs of gestational diabetes or preeclampsia  Urine tests may also be done to check for signs of infection  · A fetal ultrasound  shows pictures of your baby inside your uterus  The pictures are used to check your baby's development, movement, and position           · Genetic disorder screening tests  may be offered to you  These screening tests check your baby's risk for genetic disorders such as Down syndrome  A screening test includes a blood test and ultrasound  When should I seek immediate care? · You have pain or cramping in your abdomen or low back  · You have heavy vaginal bleeding or clotting  · You pass material that looks like tissue or large clots  Collect the material and bring it with you  When should I call my doctor or obstetrician? · You have light bleeding  · You have chills or a fever  · You have vaginal itching, burning, or pain  · You have yellow, green, white, or foul-smelling vaginal discharge  · You have pain or burning when you urinate, less urine than usual, or pink or bloody urine  · You have questions or concerns about your condition or care  CARE AGREEMENT:   You have the right to help plan your care  Learn about your health condition and how it may be treated  Discuss treatment options with your healthcare providers to decide what care you want to receive  You always have the right to refuse treatment  The above information is an  only  It is not intended as medical advice for individual conditions or treatments  Talk to your doctor, nurse or pharmacist before following any medical regimen to see if it is safe and effective for you  © Copyright [employer] 2021 Information is for End User's use only and may not be sold, redistributed or otherwise used for commercial purposes   All illustrations and images included in CareNotes® are the copyrighted property of A [employer] A M , [employer]  or [address] [place] [address] [employer]

## 2022-02-17 NOTE — PROGRESS NOTES
Patient here for early [address]  LMP 12/22/21  This is her first pregnancy  Pregnancy planned  Periods are regular  She states she has indigestion and cramping; denies spotting or nausea  She had the covid and flu vaccines  She is here by herself

## 2022-02-22 ENCOUNTER — TELEPHONE (OUTPATIENT)
Dept: PERINATAL CARE | Facility: OTHER | Age: 32
End: 2022-02-22

## 2022-02-22 NOTE — TELEPHONE ENCOUNTER
Spoke with patient regarding scheduling her for her first trimester screening, and she stated that she is unsure if she wants to schedule with [facility]  I explained that this is a time sensitive ultrasound and we have to schedule her 3/21 - 3/29 for her first trimester screening  She will be giving us a call back by the end of the week, if she decides she wants to schedule with us

## 2022-02-25 ENCOUNTER — TELEPHONE (OUTPATIENT)
Dept: OBGYN CLINIC | Facility: CLINIC | Age: 32
End: 2022-02-25

## 2022-02-25 NOTE — TELEPHONE ENCOUNTER
Pt cancelled her ob intake for march 3rd marked it as unhappy with provider but it is with a nurse so I am unsure what that is about but I left her a message informing her that she would have to cancel her pn1 if she does not do her ob intake prior to that apt

## 2022-12-21 LAB — EXTERNAL HIV SCREEN: NORMAL

## 2023-08-14 NOTE — PROGRESS NOTES
PT Evaluation     Today's date: 2020  Patient name: Rayray Colon  : 1990  MRN: [id number]  Referring provider: Rajwinder Olson PT  Dx:   Encounter Diagnosis     ICD-10-CM    1  Right hip pain M25 551        Start Time: 1650  Stop Time: 1740  Total time in clinic (min): 50 minutes    Assessment  Assessment details: 25 y/o female with c/o "tightness" in the right glute region  No known cause of injury  She denies groin sx's or other red flag sx's  No change in sx's with bowel movements or deep breathing  She has been treated recently with accupucture  She notes relief with the acupuncture, but there has been a plateau  Impairments: abnormal or restricted ROM, impaired physical strength, lacks appropriate home exercise program and pain with function    Symptom irritability: moderateUnderstanding of Dx/Px/POC: good   Prognosis: good    Goals  ST - I with HEP  2 - perform a sit-to-stand transfer with 0/10 sx's  LT - FOTO > 82  2 - perform a deep squat with 0/10 sx's  3 - lift 100# from [id number] with 0/10 sx's    Plan  Patient would benefit from: skilled physical therapy  Planned therapy interventions: manual therapy, joint mobilization, activity modification, neuromuscular re-education, patient education, stretching, strengthening, therapeutic activities, therapeutic exercise and home exercise program  Frequency: 1x week  Duration in weeks: 5  Treatment plan discussed with: patient        Subjective Evaluation    Quality of life: good    Pain  Current pain rating: 3  At best pain ratin  At worst pain ratin  Quality: tight  Aggravating factors: lifting, running, standing and walking    Social Support  Stairs in house: yes   Lives in: multiple-level home  Lives with: significant other      Diagnostic Tests  No diagnostic tests performed  Treatments  Treatments tried: accupuncture    Patient Goals  Patient goals for therapy: decreased pain, increased motion, increased strength, independence with ADLs/IADLs and return to sport/leisure activities          Objective     Palpation     Right   No palpable tenderness to the quadratus lumborum and rectus femoris  Hypertonic in the gluteus medius and iliopsoas  Tenderness of the gluteus medius and iliopsoas  Tenderness     Right Hip   Tenderness in the PSIS  No tenderness in the ASIS, iliac crest and sacroiliac joint  Neurological Testing     Sensation     Hip   Left Hip   Intact: pin prick    Right Hip   Intact: pin prick    Passive Range of Motion     Right Hip   Abduction: [facility] [address]    Additional Passive Range of Motion Details  Right:  Flexion ~ 95  Extension ~ neutral    Strength/Myotome Testing     Right Hip   Planes of Motion   Flexion: 4+  Extension: 5  Abduction: 4+  Adduction: 4-    Tests     Right Hip   Positive long sit and piriformis  Negative Ely's, femoral nerve tension, SI compression and sign of the buttock       General Comments:      Hip Comments   FOTO = 65    Supine leg length:  Right = shorter    Inflare:  Right = 6 5 inches, left = 6 75 inches                 Precautions: none      Manuals 07/20            Tissue deformation - psoas LMR            S/l gr 5 right PI correction LMR                                      Neuro Re-Ed 07/20                                                                                                       Ther Ex 07/20            Supine - long lever - hip er [place]ach - 2Xf              S/l hip add (knee ext) 2x5            S/l hip add (knee flex) 2x5            hep LMR                                                                Ther Activity                                       Gait Training                                       Modalities Minocycline Counseling: Patient advised regarding possible photosensitivity and discoloration of the teeth, skin, lips, tongue and gums.  Patient instructed to avoid sunlight, if possible.  When exposed to sunlight, patients should wear protective clothing, sunglasses, and sunscreen.  The patient was instructed to call the office immediately if the following severe adverse effects occur:  hearing changes, easy bruising/bleeding, severe headache, or vision changes.  The patient verbalized understanding of the proper use and possible adverse effects of minocycline.  All of the patient's questions and concerns were addressed.

## 2024-06-06 ENCOUNTER — OFFICE VISIT (OUTPATIENT)
Dept: URGENT CARE | Age: 34
End: 2024-06-06
Payer: COMMERCIAL

## 2024-06-06 VITALS
HEART RATE: 83 BPM | RESPIRATION RATE: 18 BRPM | TEMPERATURE: 97.6 F | OXYGEN SATURATION: 99 % | SYSTOLIC BLOOD PRESSURE: 118 MMHG | DIASTOLIC BLOOD PRESSURE: 70 MMHG

## 2024-06-06 DIAGNOSIS — S01.81XA FOREHEAD LACERATION, INITIAL ENCOUNTER: Primary | ICD-10-CM

## 2024-06-06 PROCEDURE — 12011 RPR F/E/E/N/L/M 2.5 CM/<: CPT

## 2024-06-06 PROCEDURE — G0382 LEV 3 HOSP TYPE B ED VISIT: HCPCS

## 2024-06-06 PROCEDURE — S9083 URGENT CARE CENTER GLOBAL: HCPCS

## 2024-06-06 NOTE — PATIENT INSTRUCTIONS
Keep the glue and wound site clean and dry.    Do not get the area wet for the 1st 24 hours.      The glue will help the wound heal from the inside out.  The healing will loosen the glue in the next 3-5 days.      Watch for signs of infection such as increased swelling, increased redness, pus or drainage from the wound, or red streaking.  If this develops or you develop any fever, chills, aches, joint pain, swelling, headache, dizziness, numbness or any new or concerning symptoms please proceed to ER.

## 2024-06-06 NOTE — PROGRESS NOTES
"[facility]        NAME: Wood Li is a 34 y.o. female  : 1990    MRN: [id number]  DATE: 2024  TIME: 4:20 PM    Assessment and Plan   Forehead laceration, initial encounter [S01.81XA]  1. Forehead laceration, initial encounter  Laceration repair        Keep the glue and wound site clean and dry.    Do not get the area wet for the 1st 24 hours.      The glue will help the wound heal from the inside out.  The healing will loosen the glue in the next 3-5 days.      Watch for signs of infection such as increased swelling, increased redness, pus or drainage from the wound, or red streaking.  If this develops or you develop any fever, chills, aches, joint pain, swelling, headache, dizziness, numbness or any new or concerning symptoms please proceed to ER. Universal Protocol:  Consent: Verbal consent obtained.  Risks and benefits: risks, benefits and alternatives were discussed  Consent given by: patient  Time out: Immediately prior to procedure a \"time out\" was called to verify the correct patient, procedure, equipment, support staff and site/side marked as required.  Patient understanding: patient states understanding of the procedure being performed  Patient identity confirmed: verbally with patient  Laceration repair    Date/Time: 2024 3:30 PM    Performed by: SHERITA Yañez  Authorized by: SHERITA Yañez  Body area: head/neck  Location details: forehead  Laceration length: 1 cm  Foreign bodies: no foreign bodies  Tendon involvement: none  Nerve involvement: none  Vascular damage: no    Sedation:  Patient sedated: no      Wound Dehiscence:  Superficial Wound Dehiscence: simple closure      Procedure Details:  Irrigation solution: saline  Skin closure: glue  Approximation: loose  Approximation difficulty: simple  Patient tolerance: patient tolerated the procedure well with no immediate complications            Patient Instructions       Follow up with PCP in 3-5 " days.  Proceed to  ER if symptoms worsen.    If tests have been performed at [facility], our office will contact you with results if changes need to be made to the care plan discussed with you at the visit.  [facility]    Chief Complaint     Chief Complaint   Patient presents with   • Head Laceration     Hit head on granite counter top this afternoon while reaching down for computer. Denies LOC. Laceration to left forehead above eye. Denies N/V/D tetanus 2018         History of Present Illness       Pt is a 34 year old female presenting with laceration to left side foreahead after hitting her head on counter pta.  She was able to control bleeding at home, no active bleeding on arrival. Denies LOC, no neck or back pain, no headache, nausea, dizziness or lightheadedness.  She did not take anything for the pain. Tetanus UTD.     Head Laceration  Pertinent negatives include no headaches, neck pain, numbness or weakness.       Review of Systems   Review of Systems   Musculoskeletal:  Negative for back pain, neck pain and neck stiffness.   Skin:  Positive for wound.   Neurological:  Negative for dizziness, tremors, seizures, syncope, facial asymmetry, speech difficulty, weakness, light-headedness, numbness and headaches.         Current Medications       Current Outpatient Medications:   •  Emollient (COLLAGEN EX), Apply topically, Disp: , Rfl:   •  EPINEPHrine (EPIPEN) 0.3 mg/0.3 mL SOAJ, epinephrine 0.3 mg/0.3 mL injection, auto-injector  USE AS DIRECTED, Disp: , Rfl:   •  OMEGA-3-ACID ETHYL ESTERS PO, Take 2 g by mouth 2 (two) times a day, Disp: , Rfl:   •  Prenatal Vit-Fe Fumarate-FA (PRENATAL VITAMIN PO), Take by mouth, Disp: , Rfl:     Current Allergies     Allergies as of 06/06/2024 - Reviewed 06/06/2024   Allergen Reaction Noted   • Honey bee venom Anaphylaxis 02/19/2019   • Nuts - food allergy Anaphylaxis 02/23/2022   • Sulfa antibiotics Hives and Rash 09/05/2017             The following portions of the patient's history were reviewed and updated as appropriate: allergies, current medications, past family history, past medical history, past social history, past surgical history and problem list.     Past Medical History:   Diagnosis Date   • Anxiety    • H/O breast augmentation    • H/O seasonal allergies    • Migraines        Past Surgical History:   Procedure Laterality Date   • AUGMENTATION BREAST     • BREAST SURGERY     • BREAST SURGERY      left breast    • MOUTH SURGERY     • CT INSJ/RPLCMT BREAST IMPLANT SEP DAY MASTECTOMY Left 12/21/2018    Procedure: BREAST REVISION; IMPLANT EXCHANGE;  Surgeon: Feliciano Dudley MD;  Location:  MAIN OR;  Service: Plastics   • TONSILLECTOMY         Family History   Problem Relation Age of Onset   • Arthritis Mother    • Breast cancer Mother    • Depression Mother    • Anxiety disorder Mother    • Insomnia Mother    • Osteoporosis Mother    • Arthritis Father    • Hypertension Father    • Hyperlipidemia Father    • Depression Maternal Grandmother    • Anxiety disorder Maternal Grandmother    • Osteoporosis Maternal Grandmother    • Heart disease Maternal Grandfather    • Hypertension Paternal Grandmother    • Heart disease Paternal Grandfather    • Insomnia Sister          Medications have been verified.        Objective   /70   Pulse 83   Temp 97.6 °F (36.4 °C) (Temporal)   Resp 18   LMP 05/22/2024   SpO2 99%   Patient's last menstrual period was 05/22/2024.       Physical Exam     Physical Exam  Vitals and nursing note reviewed.   Constitutional:       General: She is not in acute distress.     Appearance: Normal appearance. She is normal weight. She is not ill-appearing.   HENT:      Nose: No congestion.      Mouth/Throat:      Mouth: Mucous membranes are moist.   Eyes:      Extraocular Movements: Extraocular movements intact.      Conjunctiva/sclera: Conjunctivae normal.      Pupils: Pupils are equal, round, and reactive to  light.   Cardiovascular:      Rate and Rhythm: Normal rate.      Pulses: Normal pulses.   Pulmonary:      Effort: Pulmonary effort is normal.      Breath sounds: Normal breath sounds.   Abdominal:      General: Abdomen is flat.   Musculoskeletal:         General: Normal range of motion.      Cervical back: Normal range of motion and neck supple. No rigidity or tenderness.   Skin:     General: Skin is warm and dry.      Capillary Refill: Capillary refill takes less than 2 seconds.             Comments: Approximately 1cm vertical laceration to left side forehead above medial boarder of brow line.  Quarter size round swelling, without ecchymosis, no active bleeding.  Edges well-approximated superficial.    Neurological:      General: No focal deficit present.      Mental Status: She is alert and oriented to person, place, and time.      Cranial Nerves: No cranial nerve deficit.   Psychiatric:         Mood and Affect: Mood normal.

## 2024-10-21 ENCOUNTER — OFFICE VISIT (OUTPATIENT)
Dept: OBGYN CLINIC | Facility: HOSPITAL | Age: 34
End: 2024-10-21
Payer: COMMERCIAL

## 2024-10-21 ENCOUNTER — HOSPITAL ENCOUNTER (OUTPATIENT)
Dept: RADIOLOGY | Facility: HOSPITAL | Age: 34
Discharge: HOME | End: 2024-10-21
Attending: ORTHOPAEDIC SURGERY
Payer: COMMERCIAL

## 2024-10-21 VITALS
WEIGHT: 128.31 LBS | SYSTOLIC BLOOD PRESSURE: 108 MMHG | HEART RATE: 77 BPM | DIASTOLIC BLOOD PRESSURE: 74 MMHG | HEIGHT: 62 IN | BODY MASS INDEX: 23.61 KG/M2

## 2024-10-21 DIAGNOSIS — G89.29 CHRONIC MIDLINE LOW BACK PAIN WITHOUT SCIATICA: ICD-10-CM

## 2024-10-21 DIAGNOSIS — R52 PAIN: ICD-10-CM

## 2024-10-21 DIAGNOSIS — R52 PAIN: Primary | ICD-10-CM

## 2024-10-21 DIAGNOSIS — M54.50 CHRONIC MIDLINE LOW BACK PAIN WITHOUT SCIATICA: ICD-10-CM

## 2024-10-21 DIAGNOSIS — M51.27 HERNIATION OF INTERVERTEBRAL DISC BETWEEN L5 AND S1: ICD-10-CM

## 2024-10-21 PROCEDURE — 99204 OFFICE O/P NEW MOD 45 MIN: CPT | Performed by: ORTHOPAEDIC SURGERY

## 2024-10-21 PROCEDURE — 72110 X-RAY EXAM L-2 SPINE 4/>VWS: CPT

## 2024-10-21 NOTE — PROGRESS NOTES
Assessment & Plan/Medical Decision Makin y.o. female with Back Pain and imaging findings most notable for L5-S1 disc degeneration         The clinical, physical and imaging findings were reviewed with the patient.  Wood  has a constellation of findings consistent with Lumbar Myofascial Pain and Sacroiliac Joint Dysfunction in the setting of lumbar degenerative disease.   Fortunately patient remains neurologically intact and functional.   We discussed the treatment options including physical therapy, at home exercises, activity modifications, chiropractic medicine, oral medications, interventional spine procedures.  At this time recommend continued conservative treatments and evaluation of neural elements considering history of L5-S1 disc herniation, continued pain.    MRI lumbar spine to further evaluate patient's symptoms. Will review MRI in detail with patient at follow-up visit and discuss further treatment options at that time.    Patient instructed to return to office/ER sooner if symptoms are not improving, getting worse, or new worrisome/neurologic symptoms arise.  Patient will follow up after lumbar MRI for further treatment planning.     Subjective:      Chief Complaint: Back Pain    HPI:  Wood Li is a 34 y.o. female presenting for initial visit with chief complaint of low back pain.  Pain began in  when she was dead lifting  and has had various bouts of back pain since. She was recently picking up her 10 month old when she felt right sided back pain. She denies numbness and tingling. She claims she can feel her right SI shifting with certain movements. She received a CSI into her lumbar spine in  which did provide some relief. Her MRI from  of her lumbar spine reveal a mild disc bulge at L5-S1. She is currently scheduled to return to physical therapy.    Describes pain as sharp in nature.  Located in right SI joint and lumbar musculature.  no numbness . no burning. Pain is  worse with forward flexion and improves with rest and physical therapy.    Denies any vasu trauma. Denies fever or chills, no night sweats. Denies any bladder or bowel changes.      Conservative therapy includes the following:   Medications: None    Injections: Yes 2020     Physical Therapy: yes-currently in   Chiropractic Medicine: has  attempted  Accupunture/Massage Therapy: has attempted   These therapeutic modalities were ineffective at providing sustained pain relief/functional improvement.     Nicotine dependent: no  Occupation: CCU nurse; [employer]  Living situation: Lives with family   ADLs: patient is able to perform     Objective:     Family History   Problem Relation Age of Onset    Arthritis Mother     Breast cancer Mother     Depression Mother     Anxiety disorder Mother     Insomnia Mother     Osteoporosis Mother     Arthritis Father     Hypertension Father     Hyperlipidemia Father     Depression Maternal Grandmother     Anxiety disorder Maternal Grandmother     Osteoporosis Maternal Grandmother     Heart disease Maternal Grandfather     Hypertension Paternal Grandmother     Heart disease Paternal Grandfather     Insomnia Sister        Past Medical History:   Diagnosis Date    Anxiety     H/O breast augmentation     H/O seasonal allergies     Migraines        Current Outpatient Medications   Medication Sig Dispense Refill    Emollient (COLLAGEN EX) Apply topically      EPINEPHrine (EPIPEN) 0.3 mg/0.3 mL SOAJ epinephrine 0.3 mg/0.3 mL injection, auto-injector   USE AS DIRECTED      OMEGA-3-ACID ETHYL ESTERS PO Take 2 g by mouth 2 (two) times a day      Prenatal Vit-Fe Fumarate-FA (PRENATAL VITAMIN PO) Take by mouth       No current facility-administered medications for this visit.       Past Surgical History:   Procedure Laterality Date    AUGMENTATION BREAST      BREAST SURGERY      BREAST SURGERY      left breast     MOUTH SURGERY      [place] INSJ/RPLCMT BREAST IMPLANT SEP DAY MASTECTOMY Left 12/21/2018  "   Procedure: BREAST REVISION; IMPLANT EXCHANGE;  Surgeon: Feliciano Dudley MD;  Location:  MAIN OR;  Service: Plastics    TONSILLECTOMY         Social History     Socioeconomic History    Marital status: /Civil Union     Spouse name: Not on file    Number of children: Not on file    Years of education: Not on file    Highest education level: Not on file   Occupational History    Not on file   Tobacco Use    Smoking status: Never    Smokeless tobacco: Never   Vaping Use    Vaping status: Never Used   Substance and Sexual Activity    Alcohol use: Not Currently     Comment: social     Drug use: No    Sexual activity: Yes     Partners: Male   Other Topics Concern    Not on file   Social History Narrative    No caffeine use    Exercises regularly     Social Determinants of Health     Financial Resource Strain: Not on file   Food Insecurity: Not on file   Transportation Needs: Not on file   Physical Activity: Not on file   Stress: Not on file   Social Connections: Not on file   Intimate Partner Violence: Not on file   Housing Stability: Not on file       Allergies   Allergen Reactions    Honey Bee Venom Anaphylaxis    Nuts - Food Allergy Anaphylaxis     Specifically [place] nuts    Sulfa Antibiotics Hives and Rash       Review of Systems  General- denies fever/chills  HEENT- denies hearing loss or sore throat  Eyes- denies eye pain or visual disturbances, denies red eyes  Respiratory- denies cough or SOB  Cardio- denies chest pain or palpitations  GI- denies abdominal pain  Endocrine- denies urinary frequency  Urinary- denies pain with urination  Musculoskeletal- Negative except noted above  Skin- denies rashes or wounds  Neurological- denies dizziness or headache  Psychiatric- denies anxiety or difficulty concentrating    Physical Exam  Ht 5' 2\" (1.575 m)   Wt 58.2 kg (128 lb 4.9 oz)   BMI 23.47 kg/m²     General/Constitutional: No apparent distress: well-nourished and well developed.  Lymphatic: No appreciable " lymphadenopathy  Respiratory: Non-labored breathing  Vascular: No edema, swelling or tenderness, except as noted in detailed exam.  Integumentary: No impressive skin lesions present, except as noted in detailed exam.  Psych: Normal mood and affect, oriented to person, place and time.  MSK: normal other than stated in HPI and exam  Gait & balance: no evidence of myelopathic gait, ambulates Independently     Lumbar spine range of motion:  -Forward flexion to 90  -Extension to neutral  -Lateral bend 25 right, 25 left  -Rotation 25 right, 25 left  There is mild tenderness with palpation along lumbar paraspinal musculature, no midline tenderness     Neurologic:    Lower Extremity Motor Function    Right  Left    Iliopsoas  5/5  5/5    Quadriceps 5/5 5/5   Tibialis anterior  5/5  5/5    EHL  5/5  5/5    Gastroc. muscle  5/5  5/5    Heel rise  5/5  5/5    Toe rise  5/5  5/5      Sensory: light touch is intact to bilateral upper and lower extremities     Reflexes:    Right Left   Patellar 1+ 1+   Achilles 1+ 1+     Other tests:  Straight Leg Raise: negative  Ayo SI: positive  MAHIN SI: negative  Greater troch: no tenderness   Internal/external hip ROM: intact, no pain   Flexion/extension knee ROM: intact, no pain   Vascular: WWP extremities, 2+DP bilateral      Diagnostic Tests   IMAGING: I have personally reviewed the images and these are my findings:  Lumbar Spine X-rays from 10/21/24: multi level mild lumbar spondylosis with loss of disc height at L5-S1, no significant osteophyte formation or  facet hypertrophy, no apparent spondylolisthesis    Electronic Medical Records were reviewed including historical medical records, lumbar MRI from 2020 report which indicates right subarticular zone disc extrusion at L5-S1 with mass effect on the traversing right S1 nerve root    Procedures, if performed today     None performed       Portions of the record may have been created with voice recognition software.  Occasional wrong  "word or \"sound a like\" substitutions may have occurred due to the inherent limitations of voice recognition software.  Read the chart carefully and recognize, using context, where substitutions have occurred.   "

## 2024-10-27 ENCOUNTER — HOSPITAL ENCOUNTER (OUTPATIENT)
Dept: RADIOLOGY | Facility: HOSPITAL | Age: 34
Discharge: HOME | End: 2024-10-27
Attending: ORTHOPAEDIC SURGERY
Payer: COMMERCIAL

## 2024-10-27 DIAGNOSIS — M54.50 CHRONIC MIDLINE LOW BACK PAIN WITHOUT SCIATICA: ICD-10-CM

## 2024-10-27 DIAGNOSIS — G89.29 CHRONIC MIDLINE LOW BACK PAIN WITHOUT SCIATICA: ICD-10-CM

## 2024-10-27 DIAGNOSIS — M51.27 HERNIATION OF INTERVERTEBRAL DISC BETWEEN L5 AND S1: ICD-10-CM

## 2024-10-27 PROCEDURE — 72148 MRI LUMBAR SPINE W/O DYE: CPT

## 2024-11-08 ENCOUNTER — OFFICE VISIT (OUTPATIENT)
Dept: OBGYN CLINIC | Facility: HOSPITAL | Age: 34
End: 2024-11-08
Payer: COMMERCIAL

## 2024-11-08 VITALS
DIASTOLIC BLOOD PRESSURE: 76 MMHG | WEIGHT: 128.31 LBS | HEIGHT: 62 IN | BODY MASS INDEX: 23.61 KG/M2 | SYSTOLIC BLOOD PRESSURE: 113 MMHG | HEART RATE: 76 BPM

## 2024-11-08 DIAGNOSIS — G89.29 CHRONIC MIDLINE LOW BACK PAIN WITHOUT SCIATICA: Primary | ICD-10-CM

## 2024-11-08 DIAGNOSIS — M54.50 CHRONIC MIDLINE LOW BACK PAIN WITHOUT SCIATICA: Primary | ICD-10-CM

## 2024-11-08 PROCEDURE — 99213 OFFICE O/P EST LOW 20 MIN: CPT | Performed by: ORTHOPAEDIC SURGERY

## 2024-11-08 NOTE — PROGRESS NOTES
Assessment & Plan/Medical Decision Makin y.o. female with Back Pain and imaging findings most notable for L5-S1 disc degeneration         The clinical, physical and imaging findings were reviewed with the patient.  Wood  has a constellation of findings consistent with Lumbar Myofascial Pain and Sacroiliac Joint Dysfunction in the setting of lumbar degenerative disease.   We reviewed her lumbar MRI in detail.  Fortunately patient remains neurologically intact and functional.   We discussed the treatment options including physical therapy, at home exercises, activity modifications, chiropractic medicine, oral medications, interventional spine procedures.  At this time recommend continued conservative treatments.    Continue with physical therapy to work on core strengthening, lumbar ROM, strengthening, and stretching exercises.      Subjective:      Chief Complaint: Back Pain    HPI:  Wood Li is a 34 y.o. female presenting for initial visit with chief complaint of low back pain.  Pain began in  when she was dead lifting  and has had various bouts of back pain since. She was recently picking up her 10 month old when she felt right sided back pain. She denies numbness and tingling. She claims she can feel her right SI shifting with certain movements. She received a CSI into her lumbar spine in  which did provide some relief. Her MRI from  of her lumbar spine reveal a mild disc bulge at L5-S1. She is currently scheduled to return to physical therapy.    Describes pain as sharp in nature.  Located in right SI joint and lumbar musculature.  no numbness . no burning. Pain is worse with forward flexion and improves with rest and physical therapy.    Denies any vasu trauma. Denies fever or chills, no night sweats. Denies any bladder or bowel changes.      Conservative therapy includes the following:   Medications: None    Injections: Yes      Physical Therapy: yes-currently in    Chiropractic Medicine: has  attempted  Accupunture/Massage Therapy: has attempted   These therapeutic modalities were ineffective at providing sustained pain relief/functional improvement.     Nicotine dependent: no  Occupation: CCU nurse; [employer]  Living situation: Lives with family   ADLs: patient is able to perform     11/8/24 Update  Patient is here for follow up.  Obtained lumbar MRI in the interim.  Has started physical therapy at [facility] with benefit.  She does continue with symptoms that are relatively unchanged.    Objective:     Family History   Problem Relation Age of Onset    Arthritis Mother     Breast cancer Mother     Depression Mother     Anxiety disorder Mother     Insomnia Mother     Osteoporosis Mother     Arthritis Father     Hypertension Father     Hyperlipidemia Father     Depression Maternal Grandmother     Anxiety disorder Maternal Grandmother     Osteoporosis Maternal Grandmother     Heart disease Maternal Grandfather     Hypertension Paternal Grandmother     Heart disease Paternal Grandfather     Insomnia Sister        Past Medical History:   Diagnosis Date    Anxiety     H/O breast augmentation     H/O seasonal allergies     Migraines        Current Outpatient Medications   Medication Sig Dispense Refill    EPINEPHrine (EPIPEN) 0.3 mg/0.3 mL SOAJ epinephrine 0.3 mg/0.3 mL injection, auto-injector   USE AS DIRECTED      OMEGA-3-ACID ETHYL ESTERS PO Take 2 g by mouth 2 (two) times a day       No current facility-administered medications for this visit.       Past Surgical History:   Procedure Laterality Date    AUGMENTATION BREAST      BREAST SURGERY      BREAST SURGERY      left breast     MOUTH SURGERY      [place] INSJ/RPLCMT BREAST IMPLANT SEP DAY MASTECTOMY Left 12/21/2018    Procedure: BREAST REVISION; IMPLANT EXCHANGE;  Surgeon: Feliciano Dudley MD;  Location:  MAIN OR;  Service: Plastics    TONSILLECTOMY         Social History     Socioeconomic History    Marital  "status: /Civil Union     Spouse name: Not on file    Number of children: Not on file    Years of education: Not on file    Highest education level: Not on file   Occupational History    Not on file   Tobacco Use    Smoking status: Never    Smokeless tobacco: Never   Vaping Use    Vaping status: Never Used   Substance and Sexual Activity    Alcohol use: Not Currently     Comment: social     Drug use: No    Sexual activity: Yes     Partners: Male   Other Topics Concern    Not on file   Social History Narrative    No caffeine use    Exercises regularly     Social Determinants of Health     Financial Resource Strain: Not on file   Food Insecurity: Not on file   Transportation Needs: Not on file   Physical Activity: Not on file   Stress: Not on file   Social Connections: Not on file   Intimate Partner Violence: Not on file   Housing Stability: Not on file       Allergies   Allergen Reactions    Honey Bee Venom Anaphylaxis    Nuts - Food Allergy Anaphylaxis     Specifically [place] nuts    Sulfa Antibiotics Hives and Rash       Review of Systems  General- denies fever/chills  HEENT- denies hearing loss or sore throat  Eyes- denies eye pain or visual disturbances, denies red eyes  Respiratory- denies cough or SOB  Cardio- denies chest pain or palpitations  GI- denies abdominal pain  Endocrine- denies urinary frequency  Urinary- denies pain with urination  Musculoskeletal- Negative except noted above  Skin- denies rashes or wounds  Neurological- denies dizziness or headache  Psychiatric- denies anxiety or difficulty concentrating    Physical Exam  /76   Pulse 76   Ht 5' 2\" (1.575 m)   Wt 58.2 kg (128 lb 4.9 oz)   BMI 23.47 kg/m²     General/Constitutional: No apparent distress: well-nourished and well developed.  Lymphatic: No appreciable lymphadenopathy  Respiratory: Non-labored breathing  Vascular: No edema, swelling or tenderness, except as noted in detailed exam.  Integumentary: No impressive skin " lesions present, except as noted in detailed exam.  Psych: Normal mood and affect, oriented to person, place and time.  MSK: normal other than stated in HPI and exam  Gait & balance: no evidence of myelopathic gait, ambulates Independently     Lumbar spine range of motion:  -Forward flexion to 90  -Extension to neutral  -Lateral bend 25 right, 25 left  -Rotation 25 right, 25 left  There is mild tenderness with palpation along lumbar paraspinal musculature, no midline tenderness     Neurologic:    Lower Extremity Motor Function    Right  Left    Iliopsoas  5/5  5/5    Quadriceps 5/5 5/5   Tibialis anterior  5/5  5/5    EHL  5/5  5/5    Gastroc. muscle  5/5  5/5    Heel rise  5/5  5/5    Toe rise  5/5  5/5      Sensory: light touch is intact to bilateral upper and lower extremities     Reflexes:    Right Left   Patellar 1+ 1+   Achilles 1+ 1+     Other tests:  Straight Leg Raise: negative  Ayo SI: positive  MAHIN SI: negative  Greater troch: no tenderness   Internal/external hip ROM: intact, no pain   Flexion/extension knee ROM: intact, no pain   Vascular: WWP extremities, 2+DP bilateral      Diagnostic Tests   IMAGING: I have personally reviewed the images and these are my findings:  Lumbar Spine X-rays from 10/21/24: multi level mild lumbar spondylosis with loss of disc height at L5-S1, no significant osteophyte formation or  facet hypertrophy, no apparent spondylolisthesis    Lumbar Spine MRI from 10/27/24: L5-S1 disc degeneration, the noted L5-S1 disc extrusion from 2020 report is not visualized on this MRI, no significant central foraminal stenosis appreciated    Electronic Medical Records were reviewed including historical medical records, lumbar MRI from 2020 report which indicates right subarticular zone disc extrusion at L5-S1 with mass effect on the traversing right S1 nerve root    Procedures, if performed today     None performed       Portions of the record may have been created with voice recognition  "software.  Occasional wrong word or \"sound a like\" substitutions may have occurred due to the inherent limitations of voice recognition software.  Read the chart carefully and recognize, using context, where substitutions have occurred.   "

## 2024-12-03 ENCOUNTER — APPOINTMENT (OUTPATIENT)
Dept: LAB | Age: 34
End: 2024-12-03
Payer: COMMERCIAL

## 2024-12-03 DIAGNOSIS — D64.9 ANEMIA, UNSPECIFIED TYPE: ICD-10-CM

## 2024-12-03 DIAGNOSIS — E55.9 VITAMIN D DEFICIENCY: ICD-10-CM

## 2024-12-03 DIAGNOSIS — Z00.00 WELLNESS EXAMINATION: ICD-10-CM

## 2024-12-03 DIAGNOSIS — F41.9 ANXIETY: ICD-10-CM

## 2024-12-03 LAB
ALBUMIN SERPL BCG-MCNC: 4.2 G/DL (ref 3.5–5)
ALP SERPL-CCNC: 40 U/L (ref 34–104)
ALT SERPL W P-5'-P-CCNC: 17 U/L (ref 7–52)
ANION GAP SERPL CALCULATED.3IONS-SCNC: 5 MMOL/L (ref 4–13)
AST SERPL W P-5'-P-CCNC: 20 U/L (ref 13–39)
BASOPHILS # BLD AUTO: 0.04 THOUSANDS/ΜL (ref 0–0.1)
BASOPHILS NFR BLD AUTO: 1 % (ref 0–1)
BILIRUB SERPL-MCNC: 0.4 MG/DL (ref 0.2–1)
BUN SERPL-MCNC: 15 MG/DL (ref 5–25)
CALCIUM SERPL-MCNC: 8.7 MG/DL (ref 8.4–10.2)
CHLORIDE SERPL-SCNC: 104 MMOL/L (ref 96–108)
CHOLEST SERPL-MCNC: 162 MG/DL (ref ?–200)
CO2 SERPL-SCNC: 28 MMOL/L (ref 21–32)
CREAT SERPL-MCNC: 0.81 MG/DL (ref 0.6–1.3)
EOSINOPHIL # BLD AUTO: 0.06 THOUSAND/ΜL (ref 0–0.61)
EOSINOPHIL NFR BLD AUTO: 1 % (ref 0–6)
ERYTHROCYTE [DISTWIDTH] IN BLOOD BY AUTOMATED COUNT: 12.3 % (ref 11.6–15.1)
GFR SERPL CREATININE-BSD FRML MDRD: 93 ML/MIN/1.73SQ M
GLUCOSE P FAST SERPL-MCNC: 91 MG/DL (ref 65–99)
HCT VFR BLD AUTO: 43.8 % (ref 34.8–46.1)
HDLC SERPL-MCNC: 62 MG/DL
HGB BLD-MCNC: 14.3 G/DL (ref 11.5–15.4)
IMM GRANULOCYTES # BLD AUTO: 0.01 THOUSAND/UL (ref 0–0.2)
IMM GRANULOCYTES NFR BLD AUTO: 0 % (ref 0–2)
IRON SATN MFR SERPL: 22 % (ref 15–50)
IRON SERPL-MCNC: 79 UG/DL (ref 50–212)
LDLC SERPL CALC-MCNC: 92 MG/DL (ref 0–100)
LYMPHOCYTES # BLD AUTO: 2.15 THOUSANDS/ΜL (ref 0.6–4.47)
LYMPHOCYTES NFR BLD AUTO: 39 % (ref 14–44)
MAGNESIUM SERPL-MCNC: 2 MG/DL (ref 1.9–2.7)
MCH RBC QN AUTO: 30.1 PG (ref 26.8–34.3)
MCHC RBC AUTO-ENTMCNC: 32.6 G/DL (ref 31.4–37.4)
MCV RBC AUTO: 92 FL (ref 82–98)
MONOCYTES # BLD AUTO: 0.59 THOUSAND/ΜL (ref 0.17–1.22)
MONOCYTES NFR BLD AUTO: 11 % (ref 4–12)
NEUTROPHILS # BLD AUTO: 2.65 THOUSANDS/ΜL (ref 1.85–7.62)
NEUTS SEG NFR BLD AUTO: 48 % (ref 43–75)
NONHDLC SERPL-MCNC: 100 MG/DL
NRBC BLD AUTO-RTO: 0 /100 WBCS
PLATELET # BLD AUTO: 310 THOUSANDS/UL (ref 149–390)
PMV BLD AUTO: 9.7 FL (ref 8.9–12.7)
POTASSIUM SERPL-SCNC: 4.1 MMOL/L (ref 3.5–5.3)
PROT SERPL-MCNC: 6.8 G/DL (ref 6.4–8.4)
RBC # BLD AUTO: 4.75 MILLION/UL (ref 3.81–5.12)
SODIUM SERPL-SCNC: 137 MMOL/L (ref 135–147)
TIBC SERPL-MCNC: 357 UG/DL (ref 250–450)
TRIGL SERPL-MCNC: 38 MG/DL (ref ?–150)
TSH SERPL DL<=0.05 MIU/L-ACNC: 1.3 UIU/ML (ref 0.45–4.5)
UIBC SERPL-MCNC: 278 UG/DL (ref 155–355)
WBC # BLD AUTO: 5.5 THOUSAND/UL (ref 4.31–10.16)

## 2024-12-03 PROCEDURE — 83540 ASSAY OF IRON: CPT

## 2024-12-03 PROCEDURE — 83735 ASSAY OF MAGNESIUM: CPT

## 2024-12-03 PROCEDURE — 85025 COMPLETE CBC W/AUTO DIFF WBC: CPT

## 2024-12-03 PROCEDURE — 82306 VITAMIN D 25 HYDROXY: CPT

## 2024-12-03 PROCEDURE — 80061 LIPID PANEL: CPT

## 2024-12-03 PROCEDURE — 80053 COMPREHEN METABOLIC PANEL: CPT

## 2024-12-03 PROCEDURE — 36415 COLL VENOUS BLD VENIPUNCTURE: CPT

## 2024-12-03 PROCEDURE — 83550 IRON BINDING TEST: CPT

## 2024-12-03 PROCEDURE — 84443 ASSAY THYROID STIM HORMONE: CPT

## 2024-12-04 LAB — 25(OH)D3 SERPL-MCNC: 66.5 NG/ML (ref 30–100)

## 2025-05-05 ENCOUNTER — OFFICE VISIT (OUTPATIENT)
Dept: FAMILY MEDICINE CLINIC | Facility: CLINIC | Age: 35
End: 2025-05-05
Payer: COMMERCIAL

## 2025-05-05 ENCOUNTER — TELEPHONE (OUTPATIENT)
Dept: ADMINISTRATIVE | Facility: OTHER | Age: 35
End: 2025-05-05

## 2025-05-05 VITALS
SYSTOLIC BLOOD PRESSURE: 100 MMHG | OXYGEN SATURATION: 98 % | WEIGHT: 132 LBS | TEMPERATURE: 96.7 F | HEIGHT: 62 IN | BODY MASS INDEX: 24.29 KG/M2 | HEART RATE: 72 BPM | DIASTOLIC BLOOD PRESSURE: 80 MMHG | RESPIRATION RATE: 17 BRPM

## 2025-05-05 DIAGNOSIS — Z00.00 ANNUAL PHYSICAL EXAM: Primary | ICD-10-CM

## 2025-05-05 DIAGNOSIS — E55.9 VITAMIN D DEFICIENCY: ICD-10-CM

## 2025-05-05 DIAGNOSIS — F41.1 GENERALIZED ANXIETY DISORDER: ICD-10-CM

## 2025-05-05 DIAGNOSIS — G47.9 SLEEP DISTURBANCES: ICD-10-CM

## 2025-05-05 PROBLEM — N91.1 SECONDARY AMENORRHEA: Status: RESOLVED | Noted: 2022-02-17 | Resolved: 2025-05-05

## 2025-05-05 PROCEDURE — 99385 PREV VISIT NEW AGE 18-39: CPT | Performed by: FAMILY MEDICINE

## 2025-05-05 RX ORDER — LORAZEPAM 0.5 MG/1
0.5 TABLET ORAL EVERY 6 HOURS PRN
COMMUNITY
Start: 2025-03-26

## 2025-05-05 NOTE — ASSESSMENT & PLAN NOTE
- Notes history of sleep difficulties.  Denies snoring.  Mostly difficulty with sleep onset, frequent awakenings.  -Working on good sleep hygiene, consistent schedule.  Does have 17-month-old son, sleep is getting better which is helping.  -She is taking magnesium supplementation at bedtime with little relief.  Reviewed recommendation for magnesium L-threonate.  - Continue good sleep hygiene  -Admits she is being evaluated by airway specialist

## 2025-05-05 NOTE — ASSESSMENT & PLAN NOTE
- Recently started Zoloft 25 mg about 6 weeks ago.  Feeling better controlled on current regimen.  -Following with therapist as longstanding relationship.  -Continue current management for now.  -Has lorazepam 0.5 mg to use as needed.  PDMP reviewed and appropriate.  -Follow-up in 3 months.

## 2025-05-05 NOTE — PROGRESS NOTES
Adult Annual Physical  Name: Wood Li      : 1990      MRN: [id number]  Encounter Provider: Kwaku Abernathy DO  Encounter Date: 2025   Encounter department: [facility]    :  Assessment & Plan  Annual physical exam         Generalized anxiety disorder  - Recently started Zoloft 25 mg about 6 weeks ago.  Feeling better controlled on current regimen.  -Following with therapist as longstanding relationship.  -Continue current management for now.  -Has lorazepam 0.5 mg to use as needed.  PDMP reviewed and appropriate.  -Follow-up in 3 months.       Vitamin D deficiency  Continue current supplementation.  Vitamin D levels optimal.       Sleep disturbances  - Notes history of sleep difficulties.  Denies snoring.  Mostly difficulty with sleep onset, frequent awakenings.  -Working on good sleep hygiene, consistent schedule.  Does have 17-month-old son, sleep is getting better which is helping.  -She is taking magnesium supplementation at bedtime with little relief.  Reviewed recommendation for magnesium L-threonate.  - Continue good sleep hygiene  -Admits she is being evaluated by airway specialist       Annual physical exam               Preventive Screenings:  - Diabetes Screening: screening up-to-date  - Cholesterol Screening: screening up-to-date   - Hepatitis C screening: screening up-to-date   - HIV screening: patient declines   - Cervical cancer screening: risks/benefits discussed   - Colon cancer screening: screening not indicated   - Lung cancer screening: screening not indicated     Immunizations:    - Risks/benefits immunizations discussed      Counseling/Anticipatory Guidance:  - Alcohol: discussed moderation in alcohol intake and recommendations for healthy alcohol use.   - Drug use: discussed harms of illicit drug use and how it can negatively impact mental/physical health.   - Tobacco use: discussed harms of tobacco use and management options for quitting.   - Dental  health: discussed importance of regular tooth brushing, flossing, and dental visits.   - Sexual health: discussed sexually transmitted diseases, partner selection, use of condoms, avoidance of unintended pregnancy, and contraceptive alternatives.   - Diet: discussed recommendations for a healthy/well-balanced diet.   - Exercise: the importance of regular exercise/physical activity was discussed. Recommend exercise 3-5 times per week for at least 30 minutes.          History of Present Illness     Adult Annual Physical:  Patient presents for annual physical.     Diet and Physical Activity:  - Diet/Nutrition: consuming 3-5 servings of fruits/vegetables daily and well balanced diet.  - Exercise: walking.    Depression Screening:  - PHQ-2 Score: 0    General Health:  - Sleep: 7-8 hours of sleep on average.  - Hearing: normal hearing bilateral ears.  - Vision: goes for regular eye exams.  - Dental: brushes teeth twice daily.    /GYN Health:  - Follows with GYN: yes.   - History of STDs: no    Advanced Care Planning:  - Has an advanced directive?: no    - Has a durable medical POA?: no    - ACP document given to patient?: no      Review of Systems   Constitutional:  Negative for chills and fever.   HENT:  Negative for ear pain and sore throat.    Eyes:  Negative for pain and visual disturbance.   Respiratory:  Negative for cough and shortness of breath.    Cardiovascular:  Negative for chest pain and palpitations.   Gastrointestinal:  Negative for abdominal pain and vomiting.   Genitourinary:  Negative for dysuria and hematuria.   Musculoskeletal:  Negative for arthralgias and back pain.   Skin:  Negative for color change and rash.   Neurological:  Negative for seizures and syncope.   Psychiatric/Behavioral:  Positive for sleep disturbance. Negative for decreased concentration and dysphoric mood. The patient is nervous/anxious.    All other systems reviewed and are negative.        Objective   /80 (BP Location:  "Left arm, Patient Position: Sitting, Cuff Size: Standard)   Pulse 72   Temp (!) 96.7 °F (35.9 °C) (Tympanic)   Resp 17   Ht 5' 2\" (1.575 m)   Wt 59.9 kg (132 lb)   SpO2 98%   BMI 24.14 kg/m²     Physical Exam  Vitals and nursing note reviewed.   Constitutional:       General: She is not in acute distress.     Appearance: Normal appearance.   HENT:      Head: Normocephalic and atraumatic.      Right Ear: Tympanic membrane and external ear normal.      Left Ear: Tympanic membrane and external ear normal.      Nose: Nose normal.      Mouth/Throat:      Mouth: Mucous membranes are moist.   Eyes:      Extraocular Movements: Extraocular movements intact.      Conjunctiva/sclera: Conjunctivae normal.      Pupils: Pupils are equal, round, and reactive to light.   Cardiovascular:      Rate and Rhythm: Normal rate and regular rhythm.      Pulses: Normal pulses.      Heart sounds: Normal heart sounds. No murmur heard.  Pulmonary:      Effort: Pulmonary effort is normal.      Breath sounds: Normal breath sounds. No wheezing, rhonchi or rales.   Abdominal:      General: Bowel sounds are normal.      Palpations: Abdomen is soft.      Tenderness: There is no abdominal tenderness. There is no guarding.   Musculoskeletal:         General: Normal range of motion.      Cervical back: Normal range of motion.      Right lower leg: No edema.      Left lower leg: No edema.   Lymphadenopathy:      Cervical: No cervical adenopathy.   Skin:     General: Skin is warm.      Capillary Refill: Capillary refill takes less than 2 seconds.   Neurological:      General: No focal deficit present.      Mental Status: She is alert and oriented to person, place, and time.   Psychiatric:         Mood and Affect: Mood normal.         Behavior: Behavior normal.         "

## 2025-05-05 NOTE — PATIENT INSTRUCTIONS
"      Patient Education     Routine physical for adults   The Basics   Written by the doctors and editors at [facility]   What is a physical? -- A physical is a routine visit, or \"check-up,\" with your doctor. You might also hear it called a \"wellness visit\" or \"preventive visit.\"  During each visit, the doctor will:   Ask about your physical and mental health   Ask about your habits, behaviors, and lifestyle   Do an exam   Give you vaccines if needed   Talk to you about any medicines you take   Give advice about your health   Answer your questions  Getting regular check-ups is an important part of taking care of your health. It can help your doctor find and treat any problems you have. But it's also important for preventing health problems.  A routine physical is different from a \"sick visit.\" A sick visit is when you see a doctor because of a health concern or problem. Since physicals are scheduled ahead of time, you can think about what you want to ask the doctor.  How often should I get a physical? -- It depends on your age and health. In general, for people age 21 years and older:   If you are younger than 50 years, you might be able to get a physical every 3 years.   If you are 50 years or older, your doctor might recommend a physical every year.  If you have an ongoing health condition, like diabetes or high blood pressure, your doctor will probably want to see you more often.  What happens during a physical? -- In general, each visit will include:   Physical exam - The doctor or nurse will check your height, weight, heart rate, and blood pressure. They will also look at your eyes and ears. They will ask about how you are feeling and whether you have any symptoms that bother you.   Medicines - It's a good idea to bring a list of all the medicines you take to each doctor visit. Your doctor will talk to you about your medicines and answer any questions. Tell them if you are having any side effects that bother you. " "You should also tell them if you are having trouble paying for any of your medicines.   Habits and behaviors - This includes:   Your diet   Your exercise habits   Whether you smoke, drink alcohol, or use drugs   Whether you are sexually active   Whether you feel safe at home  Your doctor will talk to you about things you can do to improve your health and lower your risk of health problems. They will also offer help and support. For example, if you want to quit smoking, they can give you advice and might prescribe medicines. If you want to improve your diet or get more physical activity, they can help you with this, too.   Lab tests, if needed - The tests you get will depend on your age and situation. For example, your doctor might want to check your:   Cholesterol   Blood sugar   Iron level   Vaccines - The recommended vaccines will depend on your age, health, and what vaccines you already had. Vaccines are very important because they can prevent certain serious or deadly infections.   Discussion of screening - \"Screening\" means checking for diseases or other health problems before they cause symptoms. Your doctor can recommend screening based on your age, risk, and preferences. This might include tests to check for:   Cancer, such as breast, prostate, cervical, ovarian, colorectal, prostate, lung, or skin cancer   Sexually transmitted infections, such as chlamydia and gonorrhea   Mental health conditions like depression and anxiety  Your doctor will talk to you about the different types of screening tests. They can help you decide which screenings to have. They can also explain what the results might mean.   Answering questions - The physical is a good time to ask the doctor or nurse questions about your health. If needed, they can refer you to other doctors or specialists, too.  Adults older than 65 years often need other care, too. As you get older, your doctor will talk to you about:   How to prevent falling at " home   Hearing or vision tests   Memory testing   How to take your medicines safely   Making sure that you have the help and support you need at home  All topics are updated as new evidence becomes available and our peer review process is complete.  This topic retrieved from [employer] on: May 02, 2024.  Topic 733700 Version 1.0  Release: 32.4.3 - C32.122  © 2024 UpToDate, Inc. and/or its affiliates. All rights reserved.  Consumer Information Use and Disclaimer   Disclaimer: This generalized information is a limited summary of diagnosis, treatment, and/or medication information. It is not meant to be comprehensive and should be used as a tool to help the user understand and/or assess potential diagnostic and treatment options. It does NOT include all information about conditions, treatments, medications, side effects, or risks that may apply to a specific patient. It is not intended to be medical advice or a substitute for the medical advice, diagnosis, or treatment of a health care provider based on the health care provider's examination and assessment of a patient's specific and unique circumstances. Patients must speak with a health care provider for complete information about their health, medical questions, and treatment options, including any risks or benefits regarding use of medications. This information does not endorse any treatments or medications as safe, effective, or approved for treating a specific patient. UpToDate, Inc. and its affiliates disclaim any warranty or liability relating to this information or the use thereof.The use of this information is governed by the Terms of Use, available at https://www.woltersSKINNYpriceuwer.com/en/know/clinical-effectiveness-terms. 2024© UpToDate, Inc. and its affiliates and/or licensors. All rights reserved.  Copyright   © 2024 Up[employer]Date, Inc. and/or its affiliates. All rights reserved.

## 2025-06-09 ENCOUNTER — TELEPHONE (OUTPATIENT)
Age: 35
End: 2025-06-09

## 2025-06-09 NOTE — TELEPHONE ENCOUNTER
Patient called in second time has referral for sleep study  Advised once reviewed we can schedule

## 2025-07-07 ENCOUNTER — APPOINTMENT (OUTPATIENT)
Dept: LAB | Age: 35
End: 2025-07-07
Payer: COMMERCIAL

## 2025-07-07 ENCOUNTER — TRANSITIONAL CARE MANAGEMENT (OUTPATIENT)
Dept: FAMILY MEDICINE CLINIC | Facility: CLINIC | Age: 35
End: 2025-07-07

## 2025-07-07 ENCOUNTER — OFFICE VISIT (OUTPATIENT)
Dept: FAMILY MEDICINE CLINIC | Facility: CLINIC | Age: 35
End: 2025-07-07
Payer: COMMERCIAL

## 2025-07-07 VITALS
SYSTOLIC BLOOD PRESSURE: 98 MMHG | BODY MASS INDEX: 23 KG/M2 | DIASTOLIC BLOOD PRESSURE: 72 MMHG | OXYGEN SATURATION: 97 % | WEIGHT: 125 LBS | HEART RATE: 79 BPM | RESPIRATION RATE: 16 BRPM | HEIGHT: 62 IN | TEMPERATURE: 97.1 F

## 2025-07-07 DIAGNOSIS — K35.80 ACUTE APPENDICITIS, UNSPECIFIED ACUTE APPENDICITIS TYPE: ICD-10-CM

## 2025-07-07 DIAGNOSIS — K35.80 ACUTE APPENDICITIS, UNSPECIFIED ACUTE APPENDICITIS TYPE: Primary | ICD-10-CM

## 2025-07-07 PROBLEM — K37 APPENDICITIS: Status: ACTIVE | Noted: 2025-07-07

## 2025-07-07 LAB
ALBUMIN SERPL BCG-MCNC: 4.4 G/DL (ref 3.5–5)
ALP SERPL-CCNC: 63 U/L (ref 34–104)
ALT SERPL W P-5'-P-CCNC: 52 U/L (ref 7–52)
ANION GAP SERPL CALCULATED.3IONS-SCNC: 8 MMOL/L (ref 4–13)
AST SERPL W P-5'-P-CCNC: 18 U/L (ref 13–39)
BASOPHILS # BLD AUTO: 0.08 THOUSANDS/ÂΜL (ref 0–0.1)
BASOPHILS NFR BLD AUTO: 1 % (ref 0–1)
BILIRUB SERPL-MCNC: 0.42 MG/DL (ref 0.2–1)
BUN SERPL-MCNC: 16 MG/DL (ref 5–25)
CALCIUM SERPL-MCNC: 9.4 MG/DL (ref 8.4–10.2)
CHLORIDE SERPL-SCNC: 99 MMOL/L (ref 96–108)
CO2 SERPL-SCNC: 29 MMOL/L (ref 21–32)
CREAT SERPL-MCNC: 0.8 MG/DL (ref 0.6–1.3)
EOSINOPHIL # BLD AUTO: 0.08 THOUSAND/ÂΜL (ref 0–0.61)
EOSINOPHIL NFR BLD AUTO: 1 % (ref 0–6)
ERYTHROCYTE [DISTWIDTH] IN BLOOD BY AUTOMATED COUNT: 13 % (ref 11.6–15.1)
GFR SERPL CREATININE-BSD FRML MDRD: 94 ML/MIN/1.73SQ M
GLUCOSE SERPL-MCNC: 92 MG/DL (ref 65–140)
HCT VFR BLD AUTO: 45.3 % (ref 34.8–46.1)
HGB BLD-MCNC: 14.6 G/DL (ref 11.5–15.4)
IMM GRANULOCYTES # BLD AUTO: 0.02 THOUSAND/UL (ref 0–0.2)
IMM GRANULOCYTES NFR BLD AUTO: 0 % (ref 0–2)
LYMPHOCYTES # BLD AUTO: 2.67 THOUSANDS/ÂΜL (ref 0.6–4.47)
LYMPHOCYTES NFR BLD AUTO: 48 % (ref 14–44)
MCH RBC QN AUTO: 29 PG (ref 26.8–34.3)
MCHC RBC AUTO-ENTMCNC: 32.2 G/DL (ref 31.4–37.4)
MCV RBC AUTO: 90 FL (ref 82–98)
MONOCYTES # BLD AUTO: 0.65 THOUSAND/ÂΜL (ref 0.17–1.22)
MONOCYTES NFR BLD AUTO: 11 % (ref 4–12)
NEUTROPHILS # BLD AUTO: 2.22 THOUSANDS/ÂΜL (ref 1.85–7.62)
NEUTS SEG NFR BLD AUTO: 39 % (ref 43–75)
NRBC BLD AUTO-RTO: 0 /100 WBCS
PLATELET # BLD AUTO: 408 THOUSANDS/UL (ref 149–390)
PMV BLD AUTO: 9.3 FL (ref 8.9–12.7)
POTASSIUM SERPL-SCNC: 4.3 MMOL/L (ref 3.5–5.3)
PROT SERPL-MCNC: 7.6 G/DL (ref 6.4–8.4)
RBC # BLD AUTO: 5.03 MILLION/UL (ref 3.81–5.12)
SODIUM SERPL-SCNC: 136 MMOL/L (ref 135–147)
WBC # BLD AUTO: 5.72 THOUSAND/UL (ref 4.31–10.16)

## 2025-07-07 PROCEDURE — 36415 COLL VENOUS BLD VENIPUNCTURE: CPT

## 2025-07-07 PROCEDURE — 85025 COMPLETE CBC W/AUTO DIFF WBC: CPT

## 2025-07-07 PROCEDURE — 99495 TRANSJ CARE MGMT MOD F2F 14D: CPT | Performed by: FAMILY MEDICINE

## 2025-07-07 PROCEDURE — 80053 COMPREHEN METABOLIC PANEL: CPT

## 2025-07-07 NOTE — ASSESSMENT & PLAN NOTE
- Status post emergent appendectomy 6/29/2025 while traveling in [facility] and [facility].  Had emergent evacuation to [place] for appendectomy.  -Appendix found to be gangrenous.  Was monitored on antibiotics and fluids for 24 hours and discharged on Augmentin to complete 5-day course.  -Has been fever free since discharge.  Tolerating oral intake.  Does admit to some loose stools however improving.  - Surgical incision sites clean dry intact without evidence of infection.  - Denies any abdominal pain at this time.  Has follow-up with surgical team scheduled in the next 3 weeks.  - Will repeat CMP and CBC with electrolyte abnormalities and leukocytosis on previous records.  - Follow-up as needed.      Orders:  •  Comprehensive metabolic panel; Future  •  CBC and differential; Future

## 2025-07-07 NOTE — PROGRESS NOTES
Transition of Care Visit:  Name: Wood Li      : 1990      MRN: [id number]  Encounter Provider: Kwaku Abernathy DO  Encounter Date: 2025   Encounter department: [facility]    Assessment & Plan  Acute appendicitis, unspecified acute appendicitis type  - Status post emergent appendectomy 2025 while traveling in [facility] and [facility].  Had emergent evacuation to [place] for appendectomy.  -Appendix found to be gangrenous.  Was monitored on antibiotics and fluids for 24 hours and discharged on Augmentin to complete 5-day course.  -Has been fever free since discharge.  Tolerating oral intake.  Does admit to some loose stools however improving.  - Surgical incision sites clean dry intact without evidence of infection.  - Denies any abdominal pain at this time.  Has follow-up with surgical team scheduled in the next 3 weeks.  - Will repeat CMP and CBC with electrolyte abnormalities and leukocytosis on previous records.  - Follow-up as needed.      Orders:  •  Comprehensive metabolic panel; Future  •  CBC and differential; Future           History of Present Illness     Transitional Care Management Review:   Wood Li is a 35 y.o. female here for TCM follow up.     During the TCM phone call patient stated:  TCM Call (since 2025)     Date and time call was made  2025  1:54 PM    Patient was hospitialized at  Other (comment)    Comment  [facility]    Date of Admission  25    Date of discharge  25    Diagnosis  appendicitis    Disposition  Home    Were the patients medications reviewed and updated  Yes      TCM Call (since 2025)     Scheduled for follow up?  Yes    Did you obtain your prescribed medications  Yes    Do you need help managing your prescriptions or medications  No    Is transportation to your appointment needed  No    I have advised the patient to call PCP with any new or worsening symptoms  Suellen Joe, MR    Living  "Arrangements  Spouse or Significiant other    Are you recieving home care services  No        HPI  Review of Systems   Constitutional:  Negative for chills and fever.   HENT:  Negative for ear pain and sore throat.    Eyes:  Negative for pain and visual disturbance.   Respiratory:  Negative for cough and shortness of breath.    Cardiovascular:  Negative for chest pain and palpitations.   Gastrointestinal:  Positive for diarrhea. Negative for abdominal pain, constipation, nausea and vomiting.   Genitourinary:  Negative for dysuria and hematuria.   Musculoskeletal:  Negative for arthralgias and back pain.   Skin:  Negative for color change and rash.   Neurological:  Negative for seizures and syncope.   Psychiatric/Behavioral:  Negative for confusion and sleep disturbance. The patient is not nervous/anxious.    All other systems reviewed and are negative.    Objective   BP 98/72 (BP Location: Left arm, Patient Position: Sitting, Cuff Size: Standard)   Pulse 79   Temp (!) 97.1 °F (36.2 °C) (Tympanic)   Resp 16   Ht 5' 2\" (1.575 m)   Wt 56.7 kg (125 lb)   SpO2 97%   BMI 22.86 kg/m²     Physical Exam  Vitals and nursing note reviewed.   Constitutional:       General: She is not in acute distress.     Appearance: Normal appearance.   HENT:      Head: Normocephalic and atraumatic.      Right Ear: Tympanic membrane and external ear normal.      Left Ear: Tympanic membrane and external ear normal.      Nose: Nose normal.      Mouth/Throat:      Mouth: Mucous membranes are moist.     Eyes:      Extraocular Movements: Extraocular movements intact.      Conjunctiva/sclera: Conjunctivae normal.      Pupils: Pupils are equal, round, and reactive to light.       Cardiovascular:      Rate and Rhythm: Normal rate and regular rhythm.      Pulses: Normal pulses.      Heart sounds: Normal heart sounds. No murmur heard.  Pulmonary:      Effort: Pulmonary effort is normal.      Breath sounds: Normal breath sounds. No wheezing, " rhonchi or rales.   Abdominal:      General: Bowel sounds are normal.      Palpations: Abdomen is soft.      Tenderness: There is no abdominal tenderness. There is no right CVA tenderness, left CVA tenderness, guarding or rebound.      Comments: Surgical sites clean dry and intact.  No evidence of infection.     Musculoskeletal:         General: Normal range of motion.      Cervical back: Normal range of motion.      Right lower leg: No edema.      Left lower leg: No edema.   Lymphadenopathy:      Cervical: No cervical adenopathy.     Skin:     General: Skin is warm.      Capillary Refill: Capillary refill takes less than 2 seconds.     Neurological:      General: No focal deficit present.      Mental Status: She is alert and oriented to person, place, and time.     Psychiatric:         Mood and Affect: Mood normal.         Behavior: Behavior normal.       Medications have been reviewed by provider in current encounter

## 2025-08-13 ENCOUNTER — OFFICE VISIT (OUTPATIENT)
Age: 35
End: 2025-08-13
Attending: DENTIST
Payer: COMMERCIAL

## (undated) DEVICE — SYRINGE 50ML LL

## (undated) DEVICE — STERILE POLYISOPRENE POWDER-FREE SURGICAL GLOVES: Brand: PROTEXIS

## (undated) DEVICE — MASTISOL LIQ ADHESIVE 2/3ML

## (undated) DEVICE — DRAPE TOWEL: Brand: CONVERTORS

## (undated) DEVICE — TUBING SUCTION 5MM X 12 FT

## (undated) DEVICE — NEEDLE 25G X 1 1/2

## (undated) DEVICE — SYRINGE 30ML LL

## (undated) DEVICE — 4-PORT MANIFOLD: Brand: NEPTUNE 2

## (undated) DEVICE — GLOVE SURG DERMASSURE LF 6.5

## (undated) DEVICE — PAD GROUNDING ADULT

## (undated) DEVICE — 60 ML SYRINGE,TOOMEY TYPE: Brand: MONOJECT

## (undated) DEVICE — ELECTRODE EXTENDED INSULATED

## (undated) DEVICE — SYRINGE 10ML LL CONTROL TOP

## (undated) DEVICE — PENCIL SMOKE EVAC TELESCOPING W/TUBING

## (undated) DEVICE — BASIC PACK: Brand: CONVERTORS

## (undated) DEVICE — CHEST/BREAST DRAPE: Brand: CONVERTORS

## (undated) DEVICE — SKIN MARKER DUAL TIP WITH RULER CAP, FLEXIBLE RULER AND LABELS: Brand: DEVON

## (undated) DEVICE — NDL CNTR 20CT FM MAG: Brand: MEDLINE INDUSTRIES, INC.

## (undated) DEVICE — POV-IOD SOLUTION 4OZ BT

## (undated) DEVICE — VALVE REFLUX AND CAP

## (undated) DEVICE — GAUZE SPONGES,16 PLY: Brand: CURITY

## (undated) DEVICE — INTENDED FOR TISSUE SEPARATION, AND OTHER PROCEDURES THAT REQUIRE A SHARP SURGICAL BLADE TO PUNCTURE OR CUT.: Brand: BARD-PARKER ® SAFETYLOCK CARBON RIB-BACK BLADES

## (undated) DEVICE — ELECTRODE NEEDLE E-Z CLEAN 2.75IN 7CM -0013

## (undated) DEVICE — GARMENT,MEDLINE,DVT,INT,CALF,FOAM,MED: Brand: MEDLINE

## (undated) DEVICE — SUT VICRYL 2-0 J459H

## (undated) DEVICE — SMOKE EVACUATION TUBING WITH 7/8" HOSE TO HOSE CONNECTOR: Brand: BUFFALO FILTER

## (undated) DEVICE — READY WET SKIN SCRUB TRAY-LF: Brand: MEDLINE INDUSTRIES, INC.

## (undated) DEVICE — SPONGE LAP STERILE 4X18

## (undated) DEVICE — CATH URET RED RUB 26FR 16 IN ROBINSON

## (undated) DEVICE — NEEDLE BLUNT 18 G X 1 1/2IN

## (undated) DEVICE — 3M™ STERI-STRIP™ REINFORCED ADHESIVE SKIN CLOSURES, R1547, 1/2 IN X 4 IN (12 MM X 100 MM), 6 STRIPS/ENVELOPE: Brand: 3M™ STERI-STRIP™

## (undated) DEVICE — FUNNEL E KELLER 2 DELIVERY DEVICE

## (undated) DEVICE — DRAPE FLUID WARMER (BIRD BATH)

## (undated) DEVICE — REUSABLE GEL PACKINSULATEDMEDIUM5.1 X 10.5 IN.: Brand: CARDINAL HEALTH

## (undated) DEVICE — SPONGE LAP STERILE 18X18

## (undated) DEVICE — SUT PROLENE 3-0 PC-5 18 IN 8632G